# Patient Record
Sex: MALE | Race: WHITE | Employment: UNEMPLOYED | URBAN - METROPOLITAN AREA
[De-identification: names, ages, dates, MRNs, and addresses within clinical notes are randomized per-mention and may not be internally consistent; named-entity substitution may affect disease eponyms.]

---

## 2020-03-18 ENCOUNTER — OFFICE VISIT (OUTPATIENT)
Dept: ORTHOPEDIC SURGERY | Age: 20
End: 2020-03-18
Payer: COMMERCIAL

## 2020-03-18 VITALS — WEIGHT: 240 LBS | BODY MASS INDEX: 28.34 KG/M2 | HEIGHT: 77 IN

## 2020-03-18 PROCEDURE — L1832 KO ADJ JNT POS R SUP PRE CST: HCPCS | Performed by: ORTHOPAEDIC SURGERY

## 2020-03-18 PROCEDURE — 99205 OFFICE O/P NEW HI 60 MIN: CPT | Performed by: ORTHOPAEDIC SURGERY

## 2020-03-18 NOTE — PROGRESS NOTES
MD Selma Sanford, [place]         Orthopaedic Surgery and Sports Medicine      Patient Name: Alberto Snow  YOB: 2000  Patient's PCP is No primary care provider on file. SUBJECTIVE  Chief Complaint:  Knee Pain (RIGHT )      History of Present Illness:  Alberto Snow is a 22 y.o. male here regarding right knee pain. This is a 57-year-old gentleman who was in [place] this past December and was running and jumped over a chain landed on his right knee and sustained an immediate tibiofemoral dislocation. Was seen emergently and found to have both avascular and neurologic injury to his knee. The knee was reduced and he was immediately taken to surgery for the vascular injury. The date of his injury was 12/27/2020 and his surgery was performed early in the morning on 12/28/2020. Again the initial procedure was a vascular reconstruction posterior to the right knee. At that time he also had placement of an external fixator for stabilization of the knee. In addition to the external fixator he had of lower leg fasciotomy with both medial and lateral fasciotomies performed. He remained in the external fixator and he remained in [place] until earlier this week. His fixator was removed recently. His neurologic injury consisted certainly of an injury to his common peroneal nerve as well as some injury most likely to his tibial nerve and that he has decreased sensation he states on his heel. The patient is currently ambulating the assistance of a walker nonweightbearing on the extremity. Kadeem Do He rates pain at 7/10. Symptoms improve with rest.   The symptoms are made worse with being in an upright position. .  Sleep pattern is affected by the chief complaint: Yes, but improving. The patient has not had PT.    He is actually taking aspirin now on a daily basis actually takes 1 leg where he had a fasciotomy performed. Those were covered with the skin graft which are now well-healed. The harvest sites for a skin graft are healed also. He has good colors of the toes capillary refill is normal.  Because of his edema I did have some difficulty palpating pulses. Again he has good color sensation and good capillary refill. Gait: He is nonweightbearing with his walker. Palpation: no tenderness to palpation on the right lower extremity  He has a very tight Achilles. His foot is currently in equinus. Range of Motion: I did not attempt range of motion of his right knee today. Special Tests: He had no significant medial or lateral instability. But that stressing was limited. Strength: Although he has very poor tone he does appear to have some contraction capability of his quad and his hamstring. Neurologic & vascular: He has decreased sensation in the area of the right heel although he feels that he does have some sensation. He thinks that that has improved over the last couple months. He also has some sensation on the dorsal aspect of his foot he says it is not point normal although again significantly better over the last 2 months. Similar sensation in the first webspace. He cannot actively extend his ankle or his toes. He does have normal flexion of his ankle and toes. Additional Examinations:  Left Lower Extremity: Examination of the left lower extremity does not show any tenderness, deformity or injury. Range of motion is unremarkable. There is no gross instability. There are no rashes, ulcerations or lesions. Strength and tone are normal.      DIAGNOSTICS  Xrays obtained in office today: Yes  Xrays reviewed today: Yes  2 views of the right knee   Fracture: There is evidence of an avulsion fracture of the fibular collateral ligament from the lateral femoral condyle of the femur. Those fracture fragments are minimally displaced.   There is no other fracture that I can identify on these 2 views. On his lateral view he has definite posterior subluxation of the tibia it is a few millimeters certainly not severe. ASSESSMENT (Medical Decision Making)    Bruno Ocasio is a 22 y.o. male with the following diagnosis: Status post a right knee patellar dislocation which occurred 12/27/2020 in [place]. He has undergone revascularization on an emergent basis on 12/28/2020. He had a fasciotomy performed of the right lower extremity and he had placement of an external fixator which was just removed recently. He has been allowed to return to [place]. He returned to 48 hours ago and is here to see us for continued care. ICD-10-CM    1. Pain R52 XR KNEE RIGHT (1-2 VIEWS)     MRI Knee Right WO Contrast     OSR PT - [facility]     T Scope Breg Brace     EMG     CANCELED: EMG       His overall course is responding adequately to ongoing treatment      PLAN (Medical Decision Making)  Office Procedures:  Orders Placed This Encounter   Procedures    XR KNEE RIGHT (1-2 VIEWS)     Standing Status:   Future     Number of Occurrences:   1     Standing Expiration Date:   3/18/2021    MRI Knee Right WO Contrast     Standing Status:   Future     Standing Expiration Date:   3/18/2021     Scheduling Instructions:      [facility] @ [facility] [place]     Order Specific Question:   Reason for exam:     Answer:   pcl tear, acl tear, lcl tear    [facility] PT - [facility]     Referral Priority:   Routine     Referral Type:   Eval and Treat     Requested Specialty:   Physical Therapy     Number of Visits Requested:   1    EMG     PERONEAL NERVE INJURY     Standing Status:   Future     Standing Expiration Date:   3/18/2021     Order Specific Question:   Which body part? Answer:   RIGHT LOWER EXTREMITY    T Scope Breg Brace     Patient was prescribed a Breg T-Scope Brace.   The right knee will require stabilization / immobilization from this semi-rigid / rigid orthosis to improve their function. The orthosis will assist in protecting the affected area, provide functional support and facilitate healing. The prefabricated orthosis was modified in the following manner to provide a customizable fit for the patient at the time of delivery. 1.  Identification of appropriate positioning and alignment of anatomical landmarks. 2.  Trimming of straps and adjustment of frame to specifically fit patient. 3.  Polycentric hinge adjustment in flexion and extension. The patient was educated and fit by a healthcare professional with expert knowledge and specialization in brace application while under the direct supervision of the treating physician. Verbal and written instructions for the use of and application of this item were provided. They were instructed to contact the office immediately should the brace result in increased pain, decreased sensation, increased swelling or worsening of the condition. Treatment Plan:    I discussed the diagnosis and treatment options with Becky Godoy today. I spent 60 minutes face to face with this patient today. Greater than 50% of that time was spent counseling or coordinating care. We discussed obtaining an EMG study to evaluate the status of the neuro neurologic supply of the right lower extremity. We would test both the tibial and common peroneal nerve. He also will need a vascular Doppler study of the lower extremity which would be performed in about 2 weeks. He has been sent for an MRI scan to evaluate the current status of his knee from a internal derangement standpoint. He certainly has had a posterior lateral corner injury as well as a posterior cruciate ligament injury. We are not aware of the other status of the status of the other structures within the knee such as the ACL and the medial or lateral meniscus.   We do not have paperwork today from [place] although apparently the patient's family has it with

## 2020-03-19 ENCOUNTER — TELEPHONE (OUTPATIENT)
Dept: ORTHOPEDIC SURGERY | Age: 20
End: 2020-03-19

## 2020-03-19 NOTE — TELEPHONE ENCOUNTER
[place] [facility] [place]  NO PRECERT REQUIRED - PER  MARYBETH @ Pending sale to [facility]   REF #  [id number]  MP

## 2020-03-20 ENCOUNTER — HOSPITAL ENCOUNTER (OUTPATIENT)
Dept: VASCULAR LAB | Age: 20
Discharge: HOME | End: 2020-03-20
Payer: COMMERCIAL

## 2020-03-20 ENCOUNTER — HOSPITAL ENCOUNTER (OUTPATIENT)
Dept: PHYSICAL THERAPY | Age: 20
Setting detail: THERAPIES SERIES
Discharge: HOME | End: 2020-03-20
Payer: COMMERCIAL

## 2020-03-20 PROCEDURE — 97140 MANUAL THERAPY 1/> REGIONS: CPT | Performed by: PHYSICAL THERAPIST

## 2020-03-20 PROCEDURE — 97161 PT EVAL LOW COMPLEX 20 MIN: CPT | Performed by: PHYSICAL THERAPIST

## 2020-03-20 PROCEDURE — 97110 THERAPEUTIC EXERCISES: CPT | Performed by: PHYSICAL THERAPIST

## 2020-03-20 PROCEDURE — 93971 EXTREMITY STUDY: CPT

## 2020-03-20 NOTE — PLAN OF CARE
The [address] [place] [address] and Sports Rehabilitation, [address] [facility], [facility], [place] [facility] [place]  Phone: [phone]   Fax:     [phone]                                                       Physical Therapy Certification    Dear Referring Practitioner: dr Ale South ,    We had the pleasure of evaluating the following patient for physical therapy services at [address] [place] [address]. A summary of our findings can be found in the initial assessment below. This includes our plan of care. If you have any questions or concerns regarding these findings, please do not hesitate to contact me at the office phone number checked above. Thank you for the referral.       Physician Signature:_______________________________Date:__________________  By signing above (or electronic signature), therapists plan is approved by physician      Patient: Manny Alvarez   : 2000   MRN: [id number]  Referring Physician: Referring Practitioner: dr Ale South       Evaluation Date: 3/20/2020      Medical Diagnosis Information:  Diagnosis: R52 (ICD-10-CM) - Pain   Treatment Diagnosis: right knee pain - m25.561                                         Insurance information: PT Insurance Information: anthem     Precautions/ Contra-indications:   Latex Allergy:  [x]NO      []YES  Preferred Language for Healthcare:   [x]English       []other:    SUBJECTIVE: Patient stated complaint:  Rey Paula presents with a massive knee injury from this past dec. Had vascular salvage procedure as well as a ext fixation procedure. Will be in need of a multiple ligament reconstruction procedure as well as an afo for his transient foot drop. Presents with crutches and nwb.       Relevant Medical History:       Functional Disability Index: 85% lefs     Pain Scale: 7/10    Easing factors:   Rest    Provocative factors: wbing/motion     Night Pain:  -- complained of night pain associated with sleep that has a direct and significant impact on the need for therapy. (Significantly impacts the rate of recovery)  []  The patient has a complexity that has a direct and significant impact on the need for therapy. (Significantly impacts the rate of recovery and is associated with a primary condition.)       []  The patient has associated variables that influence the amount of treatment to include:  Social support, self-efficacy/motivation, prognosis, time since onset/acuity. []  The patient has generalized musculoskeletal conditions or a condition affecting multiple sites that will have a direct impact on the rate of recovery. []  The patient had a prior episode of outpatient therapy during this calendar year for a different condition. []  The patient has a mental or cognitive disorder in addition to the condition being treated that will have a direct and significant impact on the rate of recovery. Falls Risk Assessment (30 days):   [] Falls Risk assessed and no intervention required.   [] Falls Risk assessed and Patient requires intervention due to being higher risk   TUG score (>12s at risk):     [] Falls education provided, including              ASSESSMENT:   Functional Impairments   [x]Noted spinal or UE joint hypomobility   [x]Noted spinal or UE joint hypermobility   [x]Decreased UE functional ROM   [x]Decreased UE functional strength   [x]Abnormal reflexes/sensation/myotomal/dermatomal deficits   [x]Decreased RC/scapular/core strength and neuromuscular control   []other:      Functional Activity Limitations (from functional questionnaire and intake)   [x]Reduced ability to tolerate prolonged functional positions   [x]Reduced ability or difficulty with changes of positions or transfers between positions   [x]Reduced ability to maintain good posture and demonstrate good body mechanics with sitting, bending, and lifting   [x] Reduced ability or tolerance with driving and/or computer work   [x]Reduced ability to sleep   [x]Reduced ability to perform lifting, reaching, carrying tasks   [x]Reduced ability to tolerate impact through UE   [x]Reduced ability to reach behind back   [x]Reduced ability to  or hold objects   [x]Reduced ability to throw or toss an object    Participation Restrictions   [x]Reduced participation in self care activities   [x]Reduced participation in home management activities   [x]Reduced participation in work activities   [x]Reduced participation in social activities. [x]Reduced participation in sport activities. Classification :  - ligament or other connective tissue dysfunction. (Pattern 4D)  - localized inflammation. (Pattern 4E)    Prognosis/Rehab Potential:     -  - Good     Factors affecting rehab potential:  -- associated co-morbidities    Tolerance of evaluation/treatment:   -  - Good   Physical Therapy Evaluation Complexity Justification  [] A history of present problem with:  [] no personal factors and/or comorbidities that impact the plan of care;  []1-2 personal factors and/or comorbidities that impact the plan of care  []3 personal factors and/or comorbidities that impact the plan of care  [] An examination of body systems using standardized tests and measures addressing any of the following: body structures and functions (impairments), activity limitations, and/or participation restrictions;:  [] a total of 1-2 or more elements   [] a total of 3 or more elements   [] a total of 4 or more elements   [x] A clinical presentation with:  [] stable and/or uncomplicated characteristics   [x] evolving clinical presentation with changing characteristics  [] unstable and unpredictable characteristics;   [x] Clinical decision making of [x] low, [] moderate, [] high complexity using standardized patient assessment instrument and/or measurable assessment of functional outcome.     [x] EVAL (LOW) 29212 (typically 30 minutes face-to-face)  [] EVAL (MOD) 95510 (typically 30 minutes face-to-face)  [] EVAL (HIGH) 60626 (typically 45 minutes face-to-face)  [] RE-EVAL         Co-morbidities/Complexities (which will affect course of rehabilitation):   []None           Arthritic conditions   []Rheumatoid arthritis (M05.9)  []Osteoarthritis (M19.91)   Cardiovascular conditions   []Hypertension (I10)  []Hyperlipidemia (E78.5)  []Angina pectoris (I20)  []Atherosclerosis (I70)   Musculoskeletal conditions   []Disc pathology   []Congenital spine pathologies   []Prior surgical intervention  []Osteoporosis (M81.8)  []Osteopenia (M85.8)   Endocrine conditions   []Hypothyroid (E03.9)  []Hyperthyroid Gastrointestinal conditions   []Constipation (P12.31)   Metabolic conditions   []Morbid obesity (E66.01)  []Diabetes type 1(E10.65) or 2 (E11.65)   []Neuropathy (G60.9)     Pulmonary conditions   []Asthma (J45)  []Coughing   []COPD (J44.9)   Psychological Disorders  []Anxiety (F41.9)  []Depression (F32.9)   []Other:   []Other:            PLAN:  Frequency/Duration: 2 days per week for  8    Weeks:  INTERVENTIONS:  1. Therapeutic exercise including: strength training, ROM, NMR and proprioception for the scapula, core and Upper extremity  2. Manual therapy as indicated including Dry Needling/IASTM, STM, PROM, Gr I-IV mobilizations, spinal mobilization/manipulation. 3. Modalities as needed including: thermal agents, E-stim, US, iontophoresis as indicated. 4. Patient education on joint protection, activity modification, progression of HEP. HEP instruction:  See media file     GOALS:  Patient stated goal:     Therapist goals for Patient:   Short Term Goals: To be achieved in: 2 weeks  - Independent in HEP and progression per patient tolerance, in order to prevent re-injury. -  Patient will have a decrease in pain to facilitate improvement in movement, function, and ADLs as indicated by Functional Deficits. Long Term Goals:  To be achieved in:12  weeks  - The patient is expected to demonstrate less than  50%

## 2020-03-21 NOTE — FLOWSHEET NOTE
in LE, proximal hip, and core control with self care, mobility, lifting, ambulation. [x] (01928) Provided verbal/tactile cueing for activities related to improving balance, coordination, kinesthetic sense, posture, motor skill, proprioception to assist with LE, proximal hip, and core control in self-care, mobility, lifting, ambulation and eccentric single leg control. NMR and Therapeutic Activities:    [x] (24627 or 60564) Provided verbal/tactile cueing for activities related to improving balance, coordination, kinesthetic sense, posture, motor skill, proprioception and motor activation to allow for proper function of core, proximal hip and LE with self-care and ADLs and functional mobility.   [] (14848) Gait Re-education- Provided training and instruction to the patient for proper LE, core and proximal hip recruitment and positioning and eccentric body weight control with ambulation re-education including up and down stairs     Home Exercise Program:    [x] (95130) Reviewed/Progressed HEP activities related to strengthening, flexibility, endurance, ROM of core, proximal hip and LE for functional self-care, mobility, lifting and ambulation/stair navigation   [] (79285) Reviewed/Progressed HEP activities related to improving balance, coordination, kinesthetic sense, posture, motor skill, proprioception of core, proximal hip and LE for self-care, mobility, lifting, and ambulation/stair navigation      Manual Treatments:  PROM / STM / Oscillations-Mobs:  G-I, II, III, IV (PA's, Inf., Post.)  [] (98236) Provided manual therapy to mobilize LE, proximal hip and/or LS spine soft tissue/joints for the purpose of modulating pain, promoting relaxation, increasing ROM, reducing/eliminating soft tissue swelling/inflammation/restriction, improving soft tissue extensibility and allowing for proper ROM for normal function with self-care, mobility, lifting and ambulation.      Modalities:     [] GAME READY (VASO)- for significant edema, swelling, pain control. Charges:  Timed Code Treatment Minutes: 46'    Total Treatment Minutes: 68'       [x] EVAL (LOW) 08914 (typically 20 minutes face-to-face)  [] EVAL (MOD) 62053 (typically 30 minutes face-to-face)  [] EVAL (HIGH) 07383 (typically 45 minutes face-to-face)  [] RE-EVAL     [x] TYLOR(61396) x   1  [] IONTO  [] NMR (24155) x     [] VASO  [x] Manual (20082) x 2     [] Other:  [] TA x      [] Mech Traction (14496)  [] ES(attended) (68573)      [] ES (un) (90509):         ASSESSMENT:        GOALS:   Patient stated goal:    [] Progressing: [] Met: [] Not Met: [] Adjusted    Therapist goals for Patient:   Short Term Goals: To be achieved in: 2 weeks  1. Independent in HEP and progression per patient tolerance, in order to prevent re-injury. [] Progressing: [] Met: [] Not Met: [] Adjusted   2. Patient will have a decrease in pain to facilitate improvement in movement, function, and ADLs as indicated by Functional Deficits. [] Progressing: [] Met: [] Not Met: [] Adjusted    Long Term Goals: To be achieved in:   12  weeks  - The patient is expected to demonstrate less than  50% impairment, limitation or restriction in:  - walking and moving around (mobility)  - Patient will demonstrate increased passive and active ROM right knee to 130 degrees  to allow for proper joint functioning as indicated by patients Functional Deficits. [] Progressing: [] Met: [] Not Met: [] Adjusted  - Patient will demonstrate an increase in Strength to 4/5 knee to allow for proper functional mobility as indicated by patients Functional Deficits. [] Progressing: [] Met: [] Not Met: [] Adjusted  - Patient will return to desired, higher level upper extremity functional activities without increased symptoms or restriction.       [] Progressing: [] Met: [] Not Met: [] Adjusted          Overall Progression Towards Functional goals/ Treatment Progress Update:  [] Patient is progressing as expected towards

## 2020-03-23 ENCOUNTER — TELEPHONE (OUTPATIENT)
Dept: ORTHOPEDIC SURGERY | Age: 20
End: 2020-03-23

## 2020-03-23 NOTE — TELEPHONE ENCOUNTER
3/23/2020  This telephone visit is a follow up to an in person exam of [facility]. Telephone conversation with \"Conor\". Today I called Tracey Tiwari and gave him the results of his venous doppler study: normal, and his MRI scan. The MRI identified several injuries to his right knee. The scan is consistent with findings of a knee dislocation. Torn ACL, PCL, FCL, BALJIT, and posterior capsule (consistent with a PLC injury). Also a tear of the medial meniscus. I explained that this will require an extensive procedure but one that we should be able to do in one setting. Because of the length of the procedure we probably will do without a tourniquet which would be safer for his arterial injury. He understands that we will need to get some improvement in his ROM prior to having surgery. I do believe that when we can get his ROM to an appropriate point we should proceed as an urgent surgery. Tracey Tiwari has an arterial study tomorrow which is very important to evaluate the repair/reconstruction of his arterial injury.     Marci Can MD

## 2020-03-24 ENCOUNTER — HOSPITAL ENCOUNTER (OUTPATIENT)
Dept: PHYSICAL THERAPY | Age: 20
Setting detail: THERAPIES SERIES
Discharge: HOME | End: 2020-03-24
Payer: COMMERCIAL

## 2020-03-24 ENCOUNTER — HOSPITAL ENCOUNTER (OUTPATIENT)
Dept: PHYSICAL THERAPY | Age: 20
Setting detail: THERAPIES SERIES
End: 2020-03-24
Payer: COMMERCIAL

## 2020-03-24 ENCOUNTER — HOSPITAL ENCOUNTER (OUTPATIENT)
Dept: VASCULAR LAB | Age: 20
Discharge: HOME | End: 2020-03-24
Payer: COMMERCIAL

## 2020-03-24 PROCEDURE — 93926 LOWER EXTREMITY STUDY: CPT

## 2020-03-24 PROCEDURE — G0283 ELEC STIM OTHER THAN WOUND: HCPCS | Performed by: PHYSICAL THERAPIST

## 2020-03-24 PROCEDURE — 97110 THERAPEUTIC EXERCISES: CPT | Performed by: PHYSICAL THERAPIST

## 2020-03-24 PROCEDURE — 97140 MANUAL THERAPY 1/> REGIONS: CPT | Performed by: PHYSICAL THERAPIST

## 2020-03-24 NOTE — FLOWSHEET NOTE
The [facility] and Sports Rehabilitation, [address] [facility], [address]  Phone: [phone]   Fax:     [phone]        Physical Therapy Treatment Note/ Progress Report:           Date:  3/20/2020  Patient Name:  Maryanne Victor    :  2000  MRN: [id number]  Restrictions/Precautions:    Medical/Treatment Diagnosis Information:  · Diagnosis: R52 (ICD-10-CM) - Pain  · Treatment Diagnosis: right knee pain - B15.665  Insurance/Certification information:  PT Insurance Information: navjot   Physician Information:  Referring Practitioner: dr Gisel Rider   Has the plan of care been signed (Y/N):        []? Yes                    [x]? No         Date of Patient follow up with Physician:       Is this a Progress Report:     []  Yes  [x]  No        If Yes:  Date Range for reporting period:  Beginning   Ending     Progress report will be due (10 Rx or 30 days whichever is less):       Recertification will be due (POC Duration  / 90 days whichever is less):         Visit # Insurance Allowable Auth Required   3  []  Yes []  No        Functional Scale:     Date assessed:        Latex Allergy:  [x]NO      []YES  Preferred Language for Healthcare:   [x]English       []other:      Pain level:  2-7/10     SUBJECTIVE:  Pt had vascular study done this morning. Does not know full results but nothing concerning. Also reviewed MRI with Dr. Franci Hayward over the phone - LCL/PCL/ACL torn - planning to reconstruct all at the same time. Hopefully having surgery in 3-4 weeks. Has continued to work on ankle and knee ROM regularly at home.      OBJECTIVE: See eval   Observation:    Test measurements: Knee Flexion PROM 75 deg     RESTRICTIONS/PRECAUTIONS:     Exercises/Interventions:       Therapeutic Ex (92630) Sets/sec Reps Notes/CUES   HS stretch - towel  prop 5 x 30\"     Calf stretch - towel prop/strap 5 x 30\"     Quad sets   20 x 10\"  W/ biphasic NMES   Adductor ball Meets All Above Stages   []  Not Ready for Return to Sports   Comments:                               PLAN: See eval  [] Continue per plan of care [] Alter current plan (see comments above)  [x] Plan of care initiated [] Hold pending MD visit [] Discharge      Electronically signed by:  Ree Harley, PT, DPT   Note: If patient does not return for scheduled/ recommended follow up visits, this note will serve as a discharge from care along with most recent update on progress.

## 2020-03-26 ENCOUNTER — HOSPITAL ENCOUNTER (OUTPATIENT)
Dept: PHYSICAL THERAPY | Age: 20
Setting detail: THERAPIES SERIES
Discharge: HOME | End: 2020-03-26
Payer: COMMERCIAL

## 2020-03-26 PROCEDURE — G0283 ELEC STIM OTHER THAN WOUND: HCPCS | Performed by: PHYSICAL THERAPIST

## 2020-03-26 PROCEDURE — 97140 MANUAL THERAPY 1/> REGIONS: CPT | Performed by: PHYSICAL THERAPIST

## 2020-03-26 PROCEDURE — 97110 THERAPEUTIC EXERCISES: CPT | Performed by: PHYSICAL THERAPIST

## 2020-03-26 NOTE — FLOWSHEET NOTE
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 A TENS and muscle stim device is being prescribed to reduce pain and facilitate strengthening of their quad contraction. This is in accordance with the therapist's established rehabilitation plan to treat knee pain and quad atrophy. Niru Vasquez, PT    Therapeutic Exercise and NMR EXR  [x] (34893) Provided verbal/tactile cueing for activities related to strengthening, flexibility, endurance, ROM for improvements in LE, proximal hip, and core control with self care, mobility, lifting, ambulation. [x] (88364) Provided verbal/tactile cueing for activities related to improving balance, coordination, kinesthetic sense, posture, motor skill, proprioception to assist with LE, proximal hip, and core control in self-care, mobility, lifting, ambulation and eccentric single leg control.      NMR and Therapeutic Activities:    [x] (68154 or 17837) Provided verbal/tactile cueing for activities related to improving balance, coordination, kinesthetic sense, posture, motor skill, proprioception and motor activation to allow for proper function of core, proximal hip and LE with self-care and ADLs and functional mobility.   [] (87802) Gait Re-education- Provided training and instruction to the patient for proper LE, core and proximal hip recruitment and positioning and eccentric body weight control with ambulation re-education including up and down stairs     Home Exercise Program:    [x] (69975) Reviewed/Progressed HEP activities related to strengthening, flexibility, endurance, ROM of core, proximal hip and LE for functional self-care, mobility, lifting and ambulation/stair navigation   [] (38602) Reviewed/Progressed HEP activities related to improving balance, coordination, kinesthetic sense, posture, motor skill, proprioception of core, proximal hip and LE for self-care, mobility, lifting, and ambulation/stair navigation      Manual Treatments:  PROM / STM / Oscillations-Mobs:  G-I, II, III, IV (PA's, Inf., Post.)  [x] (99233) Provided manual therapy to mobilize LE, proximal hip and/or LS spine soft tissue/joints for the purpose of modulating pain, promoting relaxation, increasing ROM, reducing/eliminating soft tissue swelling/inflammation/restriction, improving soft tissue extensibility and allowing for proper ROM for normal function with self-care, mobility, lifting and ambulation. Modalities:  Ice 15' (end)   [] GAME READY (VASO)- for significant edema, swelling, pain control. [place] NMES - 15' (10\"on/20\"off)    Charges:  Timed Code Treatment Minutes: 50'    Total Treatment Minutes: 72'    3:30 - 4:55     [] EVAL (LOW) 40272 (typically 20 minutes face-to-face)  [] EVAL (MOD) 16652 (typically 30 minutes face-to-face)  [] EVAL (HIGH) 94523 (typically 45 minutes face-to-face)  [] RE-EVAL     [x] RY(66061) x   2  [] IONTO  [] NMR (25490) x     [] VASO  [x] Manual (45485) x 1     [] Other:  [] TA x      [] Mech Traction (04092)  [] ES(attended) (61071)      [x] ES (un) (28097):         ASSESSMENT:        GOALS:   Patient stated goal:    [] Progressing: [] Met: [] Not Met: [] Adjusted    Therapist goals for Patient:   Short Term Goals: To be achieved in: 2 weeks  1. Independent in HEP and progression per patient tolerance, in order to prevent re-injury. [] Progressing: [] Met: [] Not Met: [] Adjusted   2.  Patient will have a Updated HEP provided today. Will continue to progress and monitor ROM closely. Return to Play: (if applicable)   []  Stage 1: Intro to Strength   []  Stage 2: Return to Run and Strength   []  Stage 3: Return to Jump and Strength   []  Stage 4: Dynamic Strength and Agility   []  Stage 5: Sport Specific Training     []  Ready to Return to Play, Meets All Above Stages   []  Not Ready for Return to Sports   Comments:                               PLAN: See eval  [] Continue per plan of care [] Alter current plan (see comments above)  [x] Plan of care initiated [] Hold pending MD visit [] Discharge      Electronically signed by:  Piter Harris, PT, DPT   Note: If patient does not return for scheduled/ recommended follow up visits, this note will serve as a discharge from care along with most recent update on progress.

## 2020-03-30 ENCOUNTER — OFFICE VISIT (OUTPATIENT)
Dept: ORTHOPEDIC SURGERY | Age: 20
End: 2020-03-30
Payer: COMMERCIAL

## 2020-03-30 PROCEDURE — 95886 MUSC TEST DONE W/N TEST COMP: CPT | Performed by: PHYSICAL MEDICINE & REHABILITATION

## 2020-03-30 PROCEDURE — 95908 NRV CNDJ TST 3-4 STUDIES: CPT | Performed by: PHYSICAL MEDICINE & REHABILITATION

## 2020-03-31 ENCOUNTER — HOSPITAL ENCOUNTER (OUTPATIENT)
Dept: PHYSICAL THERAPY | Age: 20
Setting detail: THERAPIES SERIES
Discharge: HOME | End: 2020-03-31
Payer: COMMERCIAL

## 2020-03-31 PROCEDURE — 97110 THERAPEUTIC EXERCISES: CPT | Performed by: PHYSICAL THERAPIST

## 2020-03-31 PROCEDURE — 97140 MANUAL THERAPY 1/> REGIONS: CPT | Performed by: PHYSICAL THERAPIST

## 2020-03-31 PROCEDURE — G0283 ELEC STIM OTHER THAN WOUND: HCPCS | Performed by: PHYSICAL THERAPIST

## 2020-03-31 NOTE — FLOWSHEET NOTE
[place] NMES   Adductor ball squeeze SLR supine Alt - SAQ & HS isometric eob knee flexion (ll/ld)  Knee ext ll/ld  10' Prop w/ Ice   Ankle DF ll/ld  Wall slide - supine ERMI - ankle/knee flexionator 1' 7x                Education   2'                       Manual Intervention (49569)      Prom/stm - ankle; IASTM - gastrocsoleus; posterior glide + DF  25'                                                                                                                                             Therapeutic Exercise and NMR EXR  [x] (74018) Provided verbal/tactile cueing for activities related to strengthening, flexibility, endurance, ROM for improvements in LE, proximal hip, and core control with self care, mobility, lifting, ambulation. [x] (80654) Provided verbal/tactile cueing for activities related to improving balance, coordination, kinesthetic sense, posture, motor skill, proprioception to assist with LE, proximal hip, and core control in self-care, mobility, lifting, ambulation and eccentric single leg control.      NMR and Therapeutic Activities:    [x] (40407 or 88981) Provided verbal/tactile cueing for activities related to improving balance, coordination, kinesthetic sense, posture, motor skill, proprioception and motor activation to allow for proper function of core, proximal hip and LE with self-care and ADLs and functional mobility.   [] (44205) Gait Re-education- Provided training and instruction to the patient for proper LE, core and proximal hip recruitment and positioning and eccentric body weight control with ambulation re-education including up and down stairs     Home Exercise Program:    [x] (56580) Reviewed/Progressed HEP activities related to strengthening, flexibility, endurance, ROM of core, proximal hip and LE for functional self-care, mobility, lifting and ambulation/stair navigation   [] (60840) Reviewed/Progressed HEP activities related to improving balance, coordination, kinesthetic demonstrate increased passive and active ROM right knee to 130 degrees  to allow for proper joint functioning as indicated by patients Functional Deficits. [] Progressing: [] Met: [] Not Met: [] Adjusted  - Patient will demonstrate an increase in Strength to 4/5 knee to allow for proper functional mobility as indicated by patients Functional Deficits. [] Progressing: [] Met: [] Not Met: [] Adjusted  - Patient will return to desired, higher level upper extremity functional activities without increased symptoms or restriction. [] Progressing: [] Met: [] Not Met: [] Adjusted          Overall Progression Towards Functional goals/ Treatment Progress Update:  [] Patient is progressing as expected towards functional goals listed. [] Progression is slowed due to complexities/Impairments listed. [] Progression has been slowed due to co-morbidities. [x] Plan just implemented, too soon to assess goals progression <30days   [] Goals require adjustment due to lack of progress  [] Patient is not progressing as expected and requires additional follow up with physician  [] Other    Prognosis for POC: [x] Good [] Fair  [] Poor      Patient requires continued skilled intervention: [x] Yes  [] No    Treatment/Activity Tolerance:  [x] Patient able to complete treatment  [] Patient limited by fatigue  [] Patient limited by pain    [] Patient limited by other medical complications  [x] Other: Pt progressing steadily. Reduced swelling and stiffness of ankle with improved DF/Inv/Ev and talar tilt. Progressed knee flexion ROM by 5 deg since last visit. Extra time spent on ERMI today as pt was getting good stretch at knee and ankle. Good tolerance with IASTM with ecchymosis more distally.      Return to Play: (if applicable)   []  Stage 1: Intro to Strength   []  Stage 2: Return to Run and Strength   []  Stage 3: Return to Jump and Strength   []  Stage 4: Dynamic Strength and Agility   []  Stage 5: Sport Specific Training     [] Ready to Return to Play, Meets All Above Stages   []  Not Ready for Return to Sports   Comments:                               PLAN: See eval  [] Continue per plan of care [] Alter current plan (see comments above)  [x] Plan of care initiated [] Hold pending MD visit [] Discharge      Electronically signed by:  Marco A Perez PT, DPT   Note: If patient does not return for scheduled/ recommended follow up visits, this note will serve as a discharge from care along with most recent update on progress.

## 2020-04-02 ENCOUNTER — HOSPITAL ENCOUNTER (OUTPATIENT)
Dept: PHYSICAL THERAPY | Age: 20
Setting detail: THERAPIES SERIES
Discharge: HOME | End: 2020-04-02
Payer: COMMERCIAL

## 2020-04-02 PROCEDURE — 97140 MANUAL THERAPY 1/> REGIONS: CPT | Performed by: PHYSICAL THERAPIST

## 2020-04-02 PROCEDURE — G0283 ELEC STIM OTHER THAN WOUND: HCPCS | Performed by: PHYSICAL THERAPIST

## 2020-04-02 PROCEDURE — 97110 THERAPEUTIC EXERCISES: CPT | Performed by: PHYSICAL THERAPIST

## 2020-04-02 NOTE — FLOWSHEET NOTE
10x Prop under ankle; QS each rep   Alt - SAQ & HS isometric 3\" 15x    eob knee flexion (ll/ld)  Knee ext ll/ld  10' Prop w/ Ice   Ankle DF ll/ld  Wall slide - supine ERMI - ankle/knee flexionator 1' 7x                Education                         Manual Intervention (56243)      Prom/stm - ankle; IASTM - gastrocsoleus; posterior glide + DF  25'                                                                                                                                             Therapeutic Exercise and NMR EXR  [x] (46921) Provided verbal/tactile cueing for activities related to strengthening, flexibility, endurance, ROM for improvements in LE, proximal hip, and core control with self care, mobility, lifting, ambulation. [x] (79977) Provided verbal/tactile cueing for activities related to improving balance, coordination, kinesthetic sense, posture, motor skill, proprioception to assist with LE, proximal hip, and core control in self-care, mobility, lifting, ambulation and eccentric single leg control.      NMR and Therapeutic Activities:    [x] (01283 or 57453) Provided verbal/tactile cueing for activities related to improving balance, coordination, kinesthetic sense, posture, motor skill, proprioception and motor activation to allow for proper function of core, proximal hip and LE with self-care and ADLs and functional mobility.   [] (27129) Gait Re-education- Provided training and instruction to the patient for proper LE, core and proximal hip recruitment and positioning and eccentric body weight control with ambulation re-education including up and down stairs     Home Exercise Program:    [x] (49790) Reviewed/Progressed HEP activities related to strengthening, flexibility, endurance, ROM of core, proximal hip and LE for functional self-care, mobility, lifting and ambulation/stair navigation   [] (90605) Reviewed/Progressed HEP activities related to improving balance, coordination, kinesthetic sense, todd. Will continue to track closely. Return to Play: (if applicable)   []  Stage 1: Intro to Strength   []  Stage 2: Return to Run and Strength   []  Stage 3: Return to Jump and Strength   []  Stage 4: Dynamic Strength and Agility   []  Stage 5: Sport Specific Training     []  Ready to Return to Play, Meets All Above Stages   []  Not Ready for Return to Sports   Comments:                               PLAN: See eval  [] Continue per plan of care [] Alter current plan (see comments above)  [x] Plan of care initiated [] Hold pending MD visit [] Discharge      Electronically signed by:  Radha Ye, PT, DPT   Note: If patient does not return for scheduled/ recommended follow up visits, this note will serve as a discharge from care along with most recent update on progress.

## 2020-04-07 ENCOUNTER — HOSPITAL ENCOUNTER (OUTPATIENT)
Dept: PHYSICAL THERAPY | Age: 20
Setting detail: THERAPIES SERIES
Discharge: HOME | End: 2020-04-07
Payer: COMMERCIAL

## 2020-04-07 PROCEDURE — G0283 ELEC STIM OTHER THAN WOUND: HCPCS | Performed by: PHYSICAL THERAPIST

## 2020-04-07 PROCEDURE — 97110 THERAPEUTIC EXERCISES: CPT | Performed by: PHYSICAL THERAPIST

## 2020-04-07 PROCEDURE — 97140 MANUAL THERAPY 1/> REGIONS: CPT | Performed by: PHYSICAL THERAPIST

## 2020-04-07 PROCEDURE — 97112 NEUROMUSCULAR REEDUCATION: CPT | Performed by: PHYSICAL THERAPIST

## 2020-04-07 NOTE — FLOWSHEET NOTE
activities related to improving balance, coordination, kinesthetic sense, posture, motor skill, proprioception of core, proximal hip and LE for self-care, mobility, lifting, and ambulation/stair navigation      Manual Treatments:  PROM / STM / Oscillations-Mobs:  G-I, II, III, IV (PA's, Inf., Post.)  [x] (32686) Provided manual therapy to mobilize LE, proximal hip and/or LS spine soft tissue/joints for the purpose of modulating pain, promoting relaxation, increasing ROM, reducing/eliminating soft tissue swelling/inflammation/restriction, improving soft tissue extensibility and allowing for proper ROM for normal function with self-care, mobility, lifting and ambulation. Modalities:  Ice 15' (end)   [] GAME READY (VASO)- for significant edema, swelling, pain control. NMES - 15' (10\"on/20\"off)    Charges:  Timed Code Treatment Minutes: 48'    Total Treatment Minutes: 60'    9:28 - 10:38     [] EVAL (LOW) 68933 (typically 20 minutes face-to-face)  [] EVAL (MOD) 96348 (typically 30 minutes face-to-face)  [] EVAL (HIGH) 95135 (typically 45 minutes face-to-face)  [] RE-EVAL     [x] IJ(43804) x   1  [] IONTO  [x] NMR (06674) x 1    [] VASO  [x] Manual (64318) x 1     [] Other:  [] TA x      [] Mech Traction (44590)  [] ES(attended) (54668)      [x] ES (un) (59083):         ASSESSMENT:        GOALS:   Patient stated goal:    [] Progressing: [] Met: [] Not Met: [] Adjusted    Therapist goals for Patient:   Short Term Goals: To be achieved in: 2 weeks  1. Independent in HEP and progression per patient tolerance, in order to prevent re-injury. [] Progressing: [] Met: [] Not Met: [] Adjusted   2. Patient will have a decrease in pain to facilitate improvement in movement, function, and ADLs as indicated by Functional Deficits. [] Progressing: [] Met: [] Not Met: [] Adjusted    Long Term Goals:  To be achieved in:   12  weeks  - The patient is expected to demonstrate less than  50% impairment, limitation or restriction

## 2020-04-08 ENCOUNTER — TELEPHONE (OUTPATIENT)
Dept: ORTHOPEDIC SURGERY | Age: 20
End: 2020-04-08

## 2020-04-08 ENCOUNTER — OFFICE VISIT (OUTPATIENT)
Dept: ORTHOPEDIC SURGERY | Age: 20
End: 2020-04-08
Payer: COMMERCIAL

## 2020-04-08 VITALS — WEIGHT: 240.08 LBS | HEIGHT: 77 IN | BODY MASS INDEX: 28.35 KG/M2

## 2020-04-08 PROCEDURE — L4361 PNEUMA/VAC WALK BOOT PRE OTS: HCPCS | Performed by: ORTHOPAEDIC SURGERY

## 2020-04-08 PROCEDURE — MISC250 COMPRESSION STOCKING: Performed by: ORTHOPAEDIC SURGERY

## 2020-04-08 PROCEDURE — 99214 OFFICE O/P EST MOD 30 MIN: CPT | Performed by: ORTHOPAEDIC SURGERY

## 2020-04-08 PROCEDURE — E0114 CRUTCH UNDERARM PAIR NO WOOD: HCPCS | Performed by: ORTHOPAEDIC SURGERY

## 2020-04-08 NOTE — PROGRESS NOTES
lower extremity does not show any tenderness, deformity or injury. Range of motion is unremarkable. There is no gross instability. There are no rashes, ulcerations or lesions. Strength and tone are normal.      DIAGNOSTICS  Has had several studies since we last saw most importantly though his EMG study was performed by Dr. Ebonie Almendarez. 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 It was suggested that we repeat the study in 3 to 4 months. That reading is pretty consistent with his physical examination. ASSESSMENT (Medical Decision Making)    Kelsey Owen is a 22 y.o. male with the following diagnosis: Status post a right knee severe dislocation which occurred 12/27/2020 in [place]. He has undergone revascularization on an emergent basis on 12/28/2020. He had a fasciotomy performed of the right lower extremity and he had placement of an external fixator which was just removed recently. He has returned to [facility] and started outpatient physical therapy and has shown significant improvement although still limited range of motion of his knee and his ankle. His overall course is responding adequately to ongoing treatment      PLAN (Medical Decision Making)  Office Procedures:  No orders of the defined types were placed in this encounter. Treatment Plan:    I discussed the diagnosis and treatment options with Kelsey Owen today    I spent 60 minutes face to face with this patient today. Greater than 50% of that time was spent counseling or coordinating care. The current plan today was to discontinue the use of his walker and have him start ambulating with crutches. We will get a discontinue the T ROM brace since his knee is currently stable due to the scarring that has occurred.   He will continue outpatient physical therapy to try to progress range of motion of his ankle and his

## 2020-04-09 ENCOUNTER — HOSPITAL ENCOUNTER (OUTPATIENT)
Dept: PHYSICAL THERAPY | Age: 20
Setting detail: THERAPIES SERIES
Discharge: HOME | End: 2020-04-09
Payer: COMMERCIAL

## 2020-04-09 PROCEDURE — 97140 MANUAL THERAPY 1/> REGIONS: CPT | Performed by: PHYSICAL THERAPIST

## 2020-04-09 PROCEDURE — 97110 THERAPEUTIC EXERCISES: CPT | Performed by: PHYSICAL THERAPIST

## 2020-04-09 PROCEDURE — 97112 NEUROMUSCULAR REEDUCATION: CPT | Performed by: PHYSICAL THERAPIST

## 2020-04-09 NOTE — FLOWSHEET NOTE
10# OP   Calf stretch - towel prop/strap 5 x 30\"  Prop, 10# OP   Quad sets   Adductor ball squeeze    Hip abd isometric 10\" 10x Hooklying   SLR supine SLR supine w/ NMES Standing SLR flexion 3 10    Standing hip abd 3 10    LAQ w/ NMES Alt - SAQ & HS isometric    eob knee flexion (ll/ld)  Knee ext ll/ld    HEPAnkle DF ll/ld  6' Incline board; lean into stretch   Wall slide - supine ERMI - ankle/knee flexionator 1' 8x          Weight shifting 10\" 10x R Heel on 2\" step; L on 2\" step; 25% WB   Gait Training  5' Boot w/ heel wedge; B/L crutches         Education                         Manual Intervention (63279)      Prom/stm - ankle;   15'                                                                                                                                             Therapeutic Exercise and NMR EXR  [x] (08721) Provided verbal/tactile cueing for activities related to strengthening, flexibility, endurance, ROM for improvements in LE, proximal hip, and core control with self care, mobility, lifting, ambulation. [x] (11861) Provided verbal/tactile cueing for activities related to improving balance, coordination, kinesthetic sense, posture, motor skill, proprioception to assist with LE, proximal hip, and core control in self-care, mobility, lifting, ambulation and eccentric single leg control.      NMR and Therapeutic Activities:    [x] (86678 or 35691) Provided verbal/tactile cueing for activities related to improving balance, coordination, kinesthetic sense, posture, motor skill, proprioception and motor activation to allow for proper function of core, proximal hip and LE with self-care and ADLs and functional mobility.   [] (10373) Gait Re-education- Provided training and instruction to the patient for proper LE, core and proximal hip recruitment and positioning and eccentric body weight control with ambulation re-education including up and down stairs     Home Exercise Program:    [x] (94919) Reviewed/Progressed HEP activities related to strengthening, flexibility, endurance, ROM of core, proximal hip and LE for functional self-care, mobility, lifting and ambulation/stair navigation   [] (66974) Reviewed/Progressed HEP activities related to improving balance, coordination, kinesthetic sense, posture, motor skill, proprioception of core, proximal hip and LE for self-care, mobility, lifting, and ambulation/stair navigation      Manual Treatments:  PROM / STM / Oscillations-Mobs:  G-I, II, III, IV (PA's, Inf., Post.)  [x] (89231) Provided manual therapy to mobilize LE, proximal hip and/or LS spine soft tissue/joints for the purpose of modulating pain, promoting relaxation, increasing ROM, reducing/eliminating soft tissue swelling/inflammation/restriction, improving soft tissue extensibility and allowing for proper ROM for normal function with self-care, mobility, lifting and ambulation. Modalities:  Ice 15' (end)   [] GAME READY (VASO)- for significant edema, swelling, pain control. Charges:  Timed Code Treatment Minutes: 54'    Total Treatment Minutes: 55'    11:00 - 12:15     [] EVAL (LOW) 84746 (typically 20 minutes face-to-face)  [] EVAL (MOD) 16002 (typically 30 minutes face-to-face)  [] EVAL (HIGH) 77133 (typically 45 minutes face-to-face)  [] RE-EVAL     [x] EZ(39358) x   2  [] IONTO  [x] NMR (90470) x 1    [] VASO  [x] Manual (04420) x 1     [] Other:  [] TA x      [] Mech Traction (88090)  [] ES(attended) (75502)      [] ES (un) (26785):         ASSESSMENT:        GOALS:   Patient stated goal:    [] Progressing: [] Met: [] Not Met: [] Adjusted    Therapist goals for Patient:   Short Term Goals: To be achieved in: 2 weeks  1. Independent in HEP and progression per patient tolerance, in order to prevent re-injury. [] Progressing: [] Met: [] Not Met: [] Adjusted   2.  Patient will have a decrease in pain to facilitate improvement in movement, function, and ADLs as indicated by Functional Deficits. [] Progressing: [] Met: [] Not Met: [] Adjusted    Long Term Goals: To be achieved in:   12  weeks  - The patient is expected to demonstrate less than  50% impairment, limitation or restriction in:  - walking and moving around (mobility)  - Patient will demonstrate increased passive and active ROM right knee to 130 degrees  to allow for proper joint functioning as indicated by patients Functional Deficits. [] Progressing: [] Met: [] Not Met: [] Adjusted  - Patient will demonstrate an increase in Strength to 4/5 knee to allow for proper functional mobility as indicated by patients Functional Deficits. [] Progressing: [] Met: [] Not Met: [] Adjusted  - Patient will return to desired, higher level upper extremity functional activities without increased symptoms or restriction. [] Progressing: [] Met: [] Not Met: [] Adjusted          Overall Progression Towards Functional goals/ Treatment Progress Update:  [] Patient is progressing as expected towards functional goals listed. [] Progression is slowed due to complexities/Impairments listed. [] Progression has been slowed due to co-morbidities. [x] Plan just implemented, too soon to assess goals progression <30days   [] Goals require adjustment due to lack of progress  [] Patient is not progressing as expected and requires additional follow up with physician  [] Other    Prognosis for POC: [x] Good [] Fair  [] Poor      Patient requires continued skilled intervention: [x] Yes  [] No    Treatment/Activity Tolerance:  [x] Patient able to complete treatment  [] Patient limited by fatigue  [] Patient limited by pain    [] Patient limited by other medical complications  [x] Other: Pt's flexion ROM remained about the same, pt noted that knee felt more stiff and sore with flexion ROM today. Likely d/t some knee swelling/soreness with progression to weight bearing. Pt able to demonstrate safe gait technique with step-to pattern with B/L crutches.  Pt had

## 2020-04-14 ENCOUNTER — HOSPITAL ENCOUNTER (OUTPATIENT)
Dept: PHYSICAL THERAPY | Age: 20
Setting detail: THERAPIES SERIES
Discharge: HOME | End: 2020-04-14
Payer: COMMERCIAL

## 2020-04-14 ENCOUNTER — HOSPITAL ENCOUNTER (OUTPATIENT)
Dept: PHYSICAL THERAPY | Age: 20
Setting detail: THERAPIES SERIES
End: 2020-04-14
Payer: COMMERCIAL

## 2020-04-14 PROCEDURE — 97110 THERAPEUTIC EXERCISES: CPT | Performed by: PHYSICAL THERAPIST

## 2020-04-14 PROCEDURE — 97140 MANUAL THERAPY 1/> REGIONS: CPT | Performed by: PHYSICAL THERAPIST

## 2020-04-15 ENCOUNTER — OFFICE VISIT (OUTPATIENT)
Dept: ORTHOPEDIC SURGERY | Age: 20
End: 2020-04-15
Payer: COMMERCIAL

## 2020-04-15 ENCOUNTER — TELEPHONE (OUTPATIENT)
Dept: ORTHOPEDIC SURGERY | Age: 20
End: 2020-04-15

## 2020-04-15 VITALS — HEIGHT: 77 IN | BODY MASS INDEX: 28.34 KG/M2 | WEIGHT: 240 LBS

## 2020-04-15 PROCEDURE — 99213 OFFICE O/P EST LOW 20 MIN: CPT | Performed by: ORTHOPAEDIC SURGERY

## 2020-04-15 NOTE — LETTER
[facility]  Surgery Precert & Billing Form:    DEMOGRAPHICS:                                                                                                       Patient Name:  Betsy Daly  Patient :  2000   Patient SS#:      Patient Phone:  [phone] (home)  Alt. Patient Phone:    Patient Address:  [address] [address]    PCP:  No primary care provider on file.   Insurance: [employer]    DIAGNOSIS & PROCEDURE:                                                                                      Diagnosis: S83.511A, [id number], [id number], S83.231A  Operation: right    SURGERY  INFORMATION  Date of Surgery:   2020   Location:    [facility]   Type:    Outpatient  23 hour hold:  No  Surgeon:         Caryl Kohli MD  4/15/20     BILLING INFORMATION:                                                                                                Physician Procedure                                            CPT Codes                      PA, or Fellow Procedure                                      CPT Codes                      Precert information:

## 2020-04-15 NOTE — FLOWSHEET NOTE
HS isometric    eob knee flexion (ll/ld)  Knee ext ll/ld    HEPAnkle DF ll/ld  6' Incline board; lean into stretch   Wall slide - supine ERMI - ankle/knee flexionator 1' 8x          Weight shifting 10\" 10x R Heel on 2\" step; L on 2\" step; 25% WB   Gait Training  5' Boot w/ heel wedge; B/L crutches         Education                         Manual Intervention (23505)      Prom/stm - ankle;   15'                                                                                                                                             Therapeutic Exercise and NMR EXR  [x] (22950) Provided verbal/tactile cueing for activities related to strengthening, flexibility, endurance, ROM for improvements in LE, proximal hip, and core control with self care, mobility, lifting, ambulation. [x] (01229) Provided verbal/tactile cueing for activities related to improving balance, coordination, kinesthetic sense, posture, motor skill, proprioception to assist with LE, proximal hip, and core control in self-care, mobility, lifting, ambulation and eccentric single leg control.      NMR and Therapeutic Activities:    [x] (33524 or 59092) Provided verbal/tactile cueing for activities related to improving balance, coordination, kinesthetic sense, posture, motor skill, proprioception and motor activation to allow for proper function of core, proximal hip and LE with self-care and ADLs and functional mobility.   [] (21663) Gait Re-education- Provided training and instruction to the patient for proper LE, core and proximal hip recruitment and positioning and eccentric body weight control with ambulation re-education including up and down stairs     Home Exercise Program:    [x] (10899) Reviewed/Progressed HEP activities related to strengthening, flexibility, endurance, ROM of core, proximal hip and LE for functional self-care, mobility, lifting and ambulation/stair navigation   [] (84869) Reviewed/Progressed HEP activities related to improving demonstrate increased passive and active ROM right knee to 130 degrees  to allow for proper joint functioning as indicated by patients Functional Deficits. [] Progressing: [] Met: [] Not Met: [] Adjusted  - Patient will demonstrate an increase in Strength to 4/5 knee to allow for proper functional mobility as indicated by patients Functional Deficits. [] Progressing: [] Met: [] Not Met: [] Adjusted  - Patient will return to desired, higher level upper extremity functional activities without increased symptoms or restriction. [] Progressing: [] Met: [] Not Met: [] Adjusted          Overall Progression Towards Functional goals/ Treatment Progress Update:  [] Patient is progressing as expected towards functional goals listed. [] Progression is slowed due to complexities/Impairments listed. [] Progression has been slowed due to co-morbidities. [x] Plan just implemented, too soon to assess goals progression <30days   [] Goals require adjustment due to lack of progress  [] Patient is not progressing as expected and requires additional follow up with physician  [] Other    Prognosis for POC: [x] Good [] Fair  [] Poor      Patient requires continued skilled intervention: [x] Yes  [] No    Treatment/Activity Tolerance:  [x] Patient able to complete treatment  [] Patient limited by fatigue  [] Patient limited by pain    [] Patient limited by other medical complications  [x] Other: Pt's flexion ROM remained about the same, pt noted that knee felt more stiff and sore with flexion ROM today. Likely d/t some knee swelling/soreness with progression to weight bearing. Pt able to demonstrate safe gait technique with step-to pattern with B/L crutches. Pt had reduced balance control when attempting a step-through pattern. Demonstrated good knee stability with light weight bearing (25-50%). Fatigued quickly with standing SLR. Less quad lag with standing SLR flexion compared to supine.  Discussed and attempted alternative DF ROM stretching with light weight bearing to tolerance. Pt will be getting dynasplint for DF tomorrow, which will help immensely with his DF ROM and gait mechanics. Return to Play: (if applicable)   []  Stage 1: Intro to Strength   []  Stage 2: Return to Run and Strength   []  Stage 3: Return to Jump and Strength   []  Stage 4: Dynamic Strength and Agility   []  Stage 5: Sport Specific Training     []  Ready to Return to Play, Meets All Above Stages   []  Not Ready for Return to Sports   Comments:                               PLAN: See eval  [x] Continue per plan of care [] Alter current plan (see comments above)  [] Plan of care initiated [] Hold pending MD visit [] Discharge      Electronically signed by:  Clarke Johnson, PT, MPT   Note: If patient does not return for scheduled/ recommended follow up visits, this note will serve as a discharge from care along with most recent update on progress.

## 2020-04-15 NOTE — PROGRESS NOTES
MD Zaheer Moraes, [place]         Orthopaedic Surgery and Sports Medicine      Patient Name: Kike Yo  YOB: 2000  Patient's PCP is No primary care provider on file. SUBJECTIVE  Chief Complaint:  Knee Pain (right)      History of Present Illness:  Kike Yo is a 22 y.o. male here regarding right knee pain. Is a gentleman that we first saw on 03/18/2020 as a transfer from [place]. His history is documented in the first couple of paragraphs below. This is a 17-year-old gentleman who was in [place] this December 2019 and was running and jumped over a chain landing on his right knee and sustained an immediate tibiofemoral dislocation. Was seen emergently and found to have both avascular and neurologic injury to his knee. The knee was reduced and he was immediately taken to surgery for the vascular injury. The date of his injury was 12/27/2019 and his surgery was performed early in the morning on 12/28/2019. Again, the initial procedure was a vascular reconstruction posterior to the right knee. At that time, he also had placement of an external fixator for stabilization of the knee. In addition to the external fixator, he had of lower leg fasciotomy with both medial and lateral fasciotomies performed. He remained in the external fixator and he remained in [place] until the second week of March 2020. The patient is currently ambulating the assistance of 2 crutches and is WBAT on the extremity. He rates pain at 4/10. Since his initial visit with us, he has been in outpatient physical therapy working on range of motion of his knee and range of motion of his ankle. Both are improving but very slowly.     At his last visit we discontinued the use of his T ROM brace and got him into a tall walking boot with a heel lift which he has been wearing reading is pretty consistent with his physical examination. MRI of the right knee from 03/21/2020 shows an ACL, PCL, MCL, BALJIT tears and posterior capsule injury. There is also a tear of the medial meniscus. ASSESSMENT (Medical Decision Making)    Chelsie Blanchard is a 22 y.o. male with the following diagnosis: Status post a right knee severe dislocation which occurred 12/27/2020 in [place]. He has undergone revascularization on an emergent basis on 12/28/2020. He had a fasciotomy performed of the right lower extremity and he had placement of an external fixator which was just removed recently. He has returned to [place] and started outpatient physical therapy and has shown significant improvement although still limited range of motion of his knee and his ankle. His overall course is responding adequately to ongoing treatment      PLAN (Medical Decision Making)  Office Procedures:  No orders of the defined types were placed in this encounter. Treatment Plan:    I discussed the diagnosis and treatment options with Chelsie Blanchard today    Today, we would like the patient to remain in his high walking boot with wedge however as his range of motion of his ankle increases that wedge size can be decreased. We will ask physical therapy to help with that. We also discussed his further surgical plans today. We would most likely begin with a video arthroscopy exam under anesthesia with debridement and possible medial meniscal repair. We would also at that time likely manipulate both the knee and ankle to help with range of motion. We would want to do this surgery prior to any big reconstructive surgery. He also is going to need vascular clearance before any surgical intervention. After discussion today, the patient has elected to proceed with surgical intervention. The surgical procedure was discussed in detail.  The risks and possible complications of the surgery in general, as well as those

## 2020-04-15 NOTE — LETTER
[facility]  Surgery Scheduling Form      Patient Name:  Jaci Miramontes  Patient :  2000   Patient SS#:      Patient Phone:  [phone] (home)    Patient Address:  [address] [place] [address]    PCP:  No primary care provider on file. OP      Insurance:  Payor: Adrián Vaughan / Plan: [facility] - [place] PPO / Product Type: *No Product type* /   Insurance ID Number:   [id number]     Diagnosis: Z26.809H, [id number], E38.209X, [id number]  Operation: Right knee exam under anesthesia, arthroscopic debridement of torn ACL and PCL. Possible meniscus repair. Manipulation of right knee. Manipulation of right ankle. Location:  [facility]   Surgeon:  Clara Hayes M.D. Date:  2020 OR Time:    7:30am        Time Required:  60mins    Anesthesia: General and block. Equipment/Rep:  n/a  Classification: Outpatient  PAT Required:  NO  CARDIAC CLEARANCE: Vascular clearance     Comments/Special Request: n/a        4/15/2020 11:31 AM                                                                       In accordance with our formulary system, a generic equivalent drug may be dispensed and administered unless \"D. A. W\" is written with the medication order.    PRE-SURGICAL PHYSICIAN ORDERS             [] Inpatient  [x] Outpatient   Preop Antibiotic Prophylaxsis / DAY OF SURGERY     [x]   Cefazolin IVPB per weight base protocol If allergic to Cephalosporins   * Cefazolin 2 gram if < 119.9 kg []   Clindamycin 600mg IVPB    * Cefazolin 3 grams if > 120 kg ( > 264 lbs)        *Pediatric(<14yo) Dosinmg/kg (maximum 2 grams) If  MRSA positive:    []   Vancomycin IVPB per weight base protocol    * Vancomycin 1gm if < 80 Kg (<176 lbs)    * Vancomycin 1.5 gm if  Kg (176-264 lbs)    * Vancomycin 2 gm IVPB if >120 Kg (>264 bs)     ADDITIONAL MEDICATIONS:        []      Tranaxemic Acid: 15mg/kg up to a maximum of 2000mg              DVT PREVENTION: []   Knee high Antithrombic wraps Bilateral Right Left     []  Thigh high Antithrombic wraps Bilateral Right Left     [x]  Thigh high AMBERLY hose Bilateral Right Left      Signature          Date: 4/15/2020   Lauren Dee  : 2000                                                                                             Dear:     Jagruti Rees have been scheduled for surgery on your    At [place] [address]    On:    You will need to arrive at the hospital at: You will need to make an appointment to see your primary care physician prior to surgery for a presurgical history and physical. I have enclosed the paperwork you will need to take to this appointment. Please bring the completed form with you on the day of surgery and your primary care physician can fax to the hospital at [phone]. You will have to have lab work done at the hospital @ one week before surgery. The hospital will contact you to set this up. If you see a cardiologist, you will be required to bring a written clearance for surgery from the cardiologist.    DO NOT EAT OR DRINK ANYTHING AFTER MIDNIGHT, THE NIGHT BEFORE SURGERY. It is your responsibility to know what your insurance benefits are and what your out-of-pocket expenses may be. It is recommended that you verify your benefits with your insurance company prior to surgery. Your first physical therapy appointment is: Your first postop appointment is:    Please call [phone] ext. [phone], if you have any questions or concerns regarding the surgery or the information in this letter. Thank you.     Carley Dove,  to Domi Tijerina MD

## 2020-04-16 ENCOUNTER — APPOINTMENT (OUTPATIENT)
Dept: PHYSICAL THERAPY | Age: 20
End: 2020-04-16
Payer: COMMERCIAL

## 2020-04-16 ENCOUNTER — HOSPITAL ENCOUNTER (OUTPATIENT)
Dept: PHYSICAL THERAPY | Age: 20
Setting detail: THERAPIES SERIES
Discharge: HOME | End: 2020-04-16
Payer: COMMERCIAL

## 2020-04-16 PROCEDURE — 97140 MANUAL THERAPY 1/> REGIONS: CPT | Performed by: PHYSICAL THERAPIST

## 2020-04-16 PROCEDURE — 97110 THERAPEUTIC EXERCISES: CPT | Performed by: PHYSICAL THERAPIST

## 2020-04-16 PROCEDURE — 97112 NEUROMUSCULAR REEDUCATION: CPT | Performed by: PHYSICAL THERAPIST

## 2020-04-16 NOTE — FLOWSHEET NOTE
and LE for self-care, mobility, lifting, and ambulation/stair navigation      Manual Treatments:  PROM / STM / Oscillations-Mobs:  G-I, II, III, IV (PA's, Inf., Post.)  [x] (03858) Provided manual therapy to mobilize LE, proximal hip and/or LS spine soft tissue/joints for the purpose of modulating pain, promoting relaxation, increasing ROM, reducing/eliminating soft tissue swelling/inflammation/restriction, improving soft tissue extensibility and allowing for proper ROM for normal function with self-care, mobility, lifting and ambulation. Modalities:  Ice 15' (end)   [] GAME READY (VASO)- for significant edema, swelling, pain control. Charges:  Timed Code Treatment Minutes: 48'    Total Treatment Minutes: 48'         [] EVAL (LOW) 96965 (typically 20 minutes face-to-face)  [] EVAL (MOD) 45983 (typically 30 minutes face-to-face)  [] EVAL (HIGH) 82648 (typically 45 minutes face-to-face)  [] RE-EVAL     [x] HV(68839) x   1  [] IONTO  [x] NMR (68503) x 1    [] VASO  [x] Manual (73446) x 1     [] Other:  [] TA x      [] Mech Traction (07288)  [] ES(attended) (38960)      [] ES (un) (53701):         ASSESSMENT:        GOALS:   Patient stated goal:    [] Progressing: [] Met: [] Not Met: [] Adjusted    Therapist goals for Patient:   Short Term Goals: To be achieved in: 2 weeks  1. Independent in HEP and progression per patient tolerance, in order to prevent re-injury. [] Progressing: [] Met: [] Not Met: [] Adjusted   2. Patient will have a decrease in pain to facilitate improvement in movement, function, and ADLs as indicated by Functional Deficits. [] Progressing: [] Met: [] Not Met: [] Adjusted    Long Term Goals:  To be achieved in:   12  weeks  - The patient is expected to demonstrate less than  50% impairment, limitation or restriction in:  - walking and moving around (mobility)  - Patient will demonstrate increased passive and active ROM right knee to 130 degrees  to allow for proper joint functioning will help immensely with his DF ROM and gait mechanics. Return to Play: (if applicable)   []  Stage 1: Intro to Strength   []  Stage 2: Return to Run and Strength   []  Stage 3: Return to Jump and Strength   []  Stage 4: Dynamic Strength and Agility   []  Stage 5: Sport Specific Training     []  Ready to Return to Play, Meets All Above Stages   []  Not Ready for Return to Sports   Comments:                               PLAN: See eval  [x] Continue per plan of care [] Alter current plan (see comments above)  [] Plan of care initiated [] Hold pending MD visit [] Discharge      Electronically signed by:  Alvarado Kerr, PT, MPT   Note: If patient does not return for scheduled/ recommended follow up visits, this note will serve as a discharge from care along with most recent update on progress.

## 2020-04-21 ENCOUNTER — HOSPITAL ENCOUNTER (OUTPATIENT)
Dept: PHYSICAL THERAPY | Age: 20
Setting detail: THERAPIES SERIES
Discharge: HOME | End: 2020-04-21
Payer: COMMERCIAL

## 2020-04-21 PROCEDURE — 97110 THERAPEUTIC EXERCISES: CPT | Performed by: PHYSICAL THERAPIST

## 2020-04-21 PROCEDURE — 97112 NEUROMUSCULAR REEDUCATION: CPT | Performed by: PHYSICAL THERAPIST

## 2020-04-21 PROCEDURE — 97140 MANUAL THERAPY 1/> REGIONS: CPT | Performed by: PHYSICAL THERAPIST

## 2020-04-22 RX ORDER — ASPIRIN/CALCIUM/MAG/ALUMINUM 325 MG
325 TABLET ORAL DAILY
COMMUNITY

## 2020-04-22 NOTE — FLOWSHEET NOTE
The [address] [place] [address] and Sports Rehabilitation      Physical Therapy Treatment Note/ Progress Report:           Date:  2020  Patient Name:  João Goldberg    :  2000  MRN: [id number]  Restrictions/Precautions:    Medical/Treatment Diagnosis Information:  · Diagnosis: R52 (ICD-10-CM) - Pain  · Treatment Diagnosis: right knee pain - m25.561  · S/P R Knee Dislocation; vascular reconstruction; external fixator removed - ACL/PCL/LCL deficient  Insurance/Certification information:  PT Insurance Information: [employer]   Physician Information:  Referring Practitioner: dr Hernandez Half   Has the plan of care been signed (Y/N):        []? Yes                    [x]?   No         Date of Patient follow up with Physician:       Is this a Progress Report:     []  Yes  [x]  No        If Yes:  Date Range for reporting period:  Beginning   Ending     Progress report will be due (10 Rx or 30 days whichever is less):       Recertification will be due (POC Duration  / 90 days whichever is less):         Visit # Insurance Allowable Auth Required   9 Pending  []  Yes []  No        Functional Scale:     Date assessed:        Latex Allergy:  [x]NO      []YES  Preferred Language for Healthcare:   [x]English       []other:      Pain level:  5/10     SUBJECTIVE: \"surgery next Tuesday\"    OBJECTIVE:    Observation:    Test measurements:      RESTRICTIONS/PRECAUTIONS:     Exercises/Interventions:       Therapeutic Ex (02850) Sets/sec Reps Notes/CUES   HS stretch - towel  prop 5 x 30\"  Prop, 10# OP   Calf stretch - towel prop/strap 5 x 30\"  Prop, 10# OP   Quad sets   Adductor ball squeeze    Hip abd isometric 10\" 10x Hooklying   SLR supine SLR supine w/ NMES Standing SLR flexion 3 10    Standing hip abd 3 10    LAQ w/ NMES Alt - SAQ & HS isometric    eob knee flexion (ll/ld)  Knee ext ll/ld    HEPAnkle DF ll/ld  6' Incline board; lean into stretch   Wall slide - supine ERMI - ankle/knee flexionator 1' 8x 2 sets Functional Deficits. [x] Progressing: [] Met: [] Not Met: [] Adjusted  - Patient will demonstrate an increase in Strength to 4/5 knee to allow for proper functional mobility as indicated by patients Functional Deficits. [x] Progressing: [] Met: [] Not Met: [] Adjusted  - Patient will return to desired, higher level upper extremity functional activities without increased symptoms or restriction. [x] Progressing: [] Met: [] Not Met: [] Adjusted          Overall Progression Towards Functional goals/ Treatment Progress Update:  [] Patient is progressing as expected towards functional goals listed. [] Progression is slowed due to complexities/Impairments listed. [] Progression has been slowed due to co-morbidities. [x] Plan just implemented, too soon to assess goals progression <30days   [] Goals require adjustment due to lack of progress  [] Patient is not progressing as expected and requires additional follow up with physician  [] Other    Prognosis for POC: [x] Good [] Fair  [] Poor      Patient requires continued skilled intervention: [x] Yes  [] No    Treatment/Activity Tolerance:  [x] Patient able to complete treatment  [] Patient limited by fatigue  [] Patient limited by pain    [] Patient limited by other medical complications  [x] Other: Pt's flexion ROM remained about the same, pt noted that knee felt more stiff and sore with flexion ROM today. Likely d/t some knee swelling/soreness with progression to weight bearing. Pt able to demonstrate safe gait technique with step-to pattern with B/L crutches. Pt had reduced balance control when attempting a step-through pattern. Demonstrated good knee stability with light weight bearing (25-50%). Fatigued quickly with standing SLR. Less quad lag with standing SLR flexion compared to supine. Discussed and attempted alternative DF ROM stretching with light weight bearing to tolerance.  Pt will be getting dynasplint for DF tomorrow, which will help immensely with his DF ROM and gait mechanics. Return to Play: (if applicable)   []  Stage 1: Intro to Strength   []  Stage 2: Return to Run and Strength   []  Stage 3: Return to Jump and Strength   []  Stage 4: Dynamic Strength and Agility   []  Stage 5: Sport Specific Training     []  Ready to Return to Play, Meets All Above Stages   []  Not Ready for Return to Sports   Comments:                               PLAN: See eval  [x] Continue per plan of care [] Alter current plan (see comments above)  [] Plan of care initiated [] Hold pending MD visit [] Discharge      Electronically signed by:  Kala Renteria PT, MPT   Note: If patient does not return for scheduled/ recommended follow up visits, this note will serve as a discharge from care along with most recent update on progress.

## 2020-04-23 ENCOUNTER — HOSPITAL ENCOUNTER (OUTPATIENT)
Dept: PHYSICAL THERAPY | Age: 20
Setting detail: THERAPIES SERIES
Discharge: HOME | End: 2020-04-23
Payer: COMMERCIAL

## 2020-04-23 PROCEDURE — 97140 MANUAL THERAPY 1/> REGIONS: CPT | Performed by: PHYSICAL THERAPIST

## 2020-04-23 PROCEDURE — 97112 NEUROMUSCULAR REEDUCATION: CPT | Performed by: PHYSICAL THERAPIST

## 2020-04-23 PROCEDURE — 97110 THERAPEUTIC EXERCISES: CPT | Performed by: PHYSICAL THERAPIST

## 2020-04-27 ENCOUNTER — ANESTHESIA EVENT (OUTPATIENT)
Dept: OPERATING ROOM | Age: 20
End: 2020-04-27
Payer: COMMERCIAL

## 2020-04-28 ENCOUNTER — HOSPITAL ENCOUNTER (OUTPATIENT)
Age: 20
Setting detail: OUTPATIENT SURGERY
Discharge: HOME | End: 2020-04-28
Attending: ORTHOPAEDIC SURGERY | Admitting: ORTHOPAEDIC SURGERY
Payer: COMMERCIAL

## 2020-04-28 ENCOUNTER — ANESTHESIA (OUTPATIENT)
Dept: OPERATING ROOM | Age: 20
End: 2020-04-28
Payer: COMMERCIAL

## 2020-04-28 VITALS
WEIGHT: 230 LBS | TEMPERATURE: 97 F | DIASTOLIC BLOOD PRESSURE: 75 MMHG | RESPIRATION RATE: 15 BRPM | SYSTOLIC BLOOD PRESSURE: 143 MMHG | BODY MASS INDEX: 27.16 KG/M2 | OXYGEN SATURATION: 97 % | HEART RATE: 74 BPM | HEIGHT: 77 IN

## 2020-04-28 VITALS — OXYGEN SATURATION: 97 % | TEMPERATURE: 97.2 F | SYSTOLIC BLOOD PRESSURE: 97 MMHG | DIASTOLIC BLOOD PRESSURE: 52 MMHG

## 2020-04-28 PROCEDURE — C1769 GUIDE WIRE: HCPCS | Performed by: ORTHOPAEDIC SURGERY

## 2020-04-28 PROCEDURE — 3700000000 HC ANESTHESIA ATTENDED CARE: Performed by: ORTHOPAEDIC SURGERY

## 2020-04-28 PROCEDURE — 2500000003 HC RX 250 WO HCPCS: Performed by: NURSE ANESTHETIST, CERTIFIED REGISTERED

## 2020-04-28 PROCEDURE — 64447 NJX AA&/STRD FEMORAL NRV IMG: CPT | Performed by: ANESTHESIOLOGY

## 2020-04-28 PROCEDURE — 7100000011 HC PHASE II RECOVERY - ADDTL 15 MIN: Performed by: ORTHOPAEDIC SURGERY

## 2020-04-28 PROCEDURE — 2709999900 HC NON-CHARGEABLE SUPPLY: Performed by: ORTHOPAEDIC SURGERY

## 2020-04-28 PROCEDURE — 2720000010 HC SURG SUPPLY STERILE: Performed by: ORTHOPAEDIC SURGERY

## 2020-04-28 PROCEDURE — 3600000004 HC SURGERY LEVEL 4 BASE: Performed by: ORTHOPAEDIC SURGERY

## 2020-04-28 PROCEDURE — 2580000003 HC RX 258: Performed by: ORTHOPAEDIC SURGERY

## 2020-04-28 PROCEDURE — 3700000001 HC ADD 15 MINUTES (ANESTHESIA): Performed by: ORTHOPAEDIC SURGERY

## 2020-04-28 PROCEDURE — 6360000002 HC RX W HCPCS: Performed by: ANESTHESIOLOGY

## 2020-04-28 PROCEDURE — 2580000003 HC RX 258: Performed by: ANESTHESIOLOGY

## 2020-04-28 PROCEDURE — 7100000010 HC PHASE II RECOVERY - FIRST 15 MIN: Performed by: ORTHOPAEDIC SURGERY

## 2020-04-28 PROCEDURE — 3600000014 HC SURGERY LEVEL 4 ADDTL 15MIN: Performed by: ORTHOPAEDIC SURGERY

## 2020-04-28 PROCEDURE — 6360000002 HC RX W HCPCS: Performed by: NURSE ANESTHETIST, CERTIFIED REGISTERED

## 2020-04-28 PROCEDURE — 6360000002 HC RX W HCPCS: Performed by: ORTHOPAEDIC SURGERY

## 2020-04-28 PROCEDURE — 7100000001 HC PACU RECOVERY - ADDTL 15 MIN: Performed by: ORTHOPAEDIC SURGERY

## 2020-04-28 PROCEDURE — 7100000000 HC PACU RECOVERY - FIRST 15 MIN: Performed by: ORTHOPAEDIC SURGERY

## 2020-04-28 RX ORDER — LIDOCAINE HYDROCHLORIDE 10 MG/ML
2 INJECTION, SOLUTION INFILTRATION; PERINEURAL
Status: DISCONTINUED | OUTPATIENT
Start: 2020-04-28 | End: 2020-04-28 | Stop reason: HOSPADM

## 2020-04-28 RX ORDER — LIDOCAINE HYDROCHLORIDE 20 MG/ML
INJECTION, SOLUTION INFILTRATION; PERINEURAL PRN
Status: DISCONTINUED | OUTPATIENT
Start: 2020-04-28 | End: 2020-04-28 | Stop reason: SDUPTHER

## 2020-04-28 RX ORDER — DIPHENHYDRAMINE HYDROCHLORIDE 50 MG/ML
12.5 INJECTION INTRAMUSCULAR; INTRAVENOUS
Status: DISCONTINUED | OUTPATIENT
Start: 2020-04-28 | End: 2020-04-28 | Stop reason: HOSPADM

## 2020-04-28 RX ORDER — FENTANYL CITRATE 50 UG/ML
INJECTION, SOLUTION INTRAMUSCULAR; INTRAVENOUS PRN
Status: DISCONTINUED | OUTPATIENT
Start: 2020-04-28 | End: 2020-04-28 | Stop reason: SDUPTHER

## 2020-04-28 RX ORDER — MIDAZOLAM HYDROCHLORIDE 1 MG/ML
INJECTION INTRAMUSCULAR; INTRAVENOUS PRN
Status: DISCONTINUED | OUTPATIENT
Start: 2020-04-28 | End: 2020-04-28 | Stop reason: SDUPTHER

## 2020-04-28 RX ORDER — HYDRALAZINE HYDROCHLORIDE 20 MG/ML
5 INJECTION INTRAMUSCULAR; INTRAVENOUS EVERY 10 MIN PRN
Status: DISCONTINUED | OUTPATIENT
Start: 2020-04-28 | End: 2020-04-28 | Stop reason: HOSPADM

## 2020-04-28 RX ORDER — PROMETHAZINE HYDROCHLORIDE 25 MG/ML
6.25 INJECTION, SOLUTION INTRAMUSCULAR; INTRAVENOUS
Status: DISCONTINUED | OUTPATIENT
Start: 2020-04-28 | End: 2020-04-28 | Stop reason: HOSPADM

## 2020-04-28 RX ORDER — SODIUM CHLORIDE, SODIUM LACTATE, POTASSIUM CHLORIDE, CALCIUM CHLORIDE 600; 310; 30; 20 MG/100ML; MG/100ML; MG/100ML; MG/100ML
INJECTION, SOLUTION INTRAVENOUS CONTINUOUS
Status: DISCONTINUED | OUTPATIENT
Start: 2020-04-28 | End: 2020-04-28 | Stop reason: HOSPADM

## 2020-04-28 RX ORDER — SODIUM CHLORIDE, SODIUM LACTATE, POTASSIUM CHLORIDE, CALCIUM CHLORIDE 600; 310; 30; 20 MG/100ML; MG/100ML; MG/100ML; MG/100ML
INJECTION, SOLUTION INTRAVENOUS
Status: COMPLETED
Start: 2020-04-28 | End: 2020-04-28

## 2020-04-28 RX ORDER — MORPHINE SULFATE 2 MG/ML
1 INJECTION, SOLUTION INTRAMUSCULAR; INTRAVENOUS EVERY 5 MIN PRN
Status: DISCONTINUED | OUTPATIENT
Start: 2020-04-28 | End: 2020-04-28 | Stop reason: HOSPADM

## 2020-04-28 RX ORDER — SODIUM CHLORIDE, SODIUM LACTATE, POTASSIUM CHLORIDE, AND CALCIUM CHLORIDE .6; .31; .03; .02 G/100ML; G/100ML; G/100ML; G/100ML
IRRIGANT IRRIGATION PRN
Status: DISCONTINUED | OUTPATIENT
Start: 2020-04-28 | End: 2020-04-28 | Stop reason: ALTCHOICE

## 2020-04-28 RX ORDER — LABETALOL HYDROCHLORIDE 5 MG/ML
5 INJECTION, SOLUTION INTRAVENOUS EVERY 10 MIN PRN
Status: DISCONTINUED | OUTPATIENT
Start: 2020-04-28 | End: 2020-04-28 | Stop reason: HOSPADM

## 2020-04-28 RX ORDER — OXYCODONE HYDROCHLORIDE AND ACETAMINOPHEN 5; 325 MG/1; MG/1
1 TABLET ORAL EVERY 6 HOURS PRN
Qty: 28 TABLET | Refills: 0 | Status: SHIPPED | OUTPATIENT
Start: 2020-04-28 | End: 2020-05-05

## 2020-04-28 RX ORDER — DEXAMETHASONE SODIUM PHOSPHATE 10 MG/ML
INJECTION INTRAMUSCULAR; INTRAVENOUS PRN
Status: DISCONTINUED | OUTPATIENT
Start: 2020-04-28 | End: 2020-04-28 | Stop reason: SDUPTHER

## 2020-04-28 RX ORDER — PROPOFOL 10 MG/ML
INJECTION, EMULSION INTRAVENOUS PRN
Status: DISCONTINUED | OUTPATIENT
Start: 2020-04-28 | End: 2020-04-28 | Stop reason: SDUPTHER

## 2020-04-28 RX ORDER — ONDANSETRON 2 MG/ML
INJECTION INTRAMUSCULAR; INTRAVENOUS PRN
Status: DISCONTINUED | OUTPATIENT
Start: 2020-04-28 | End: 2020-04-28 | Stop reason: SDUPTHER

## 2020-04-28 RX ORDER — ONDANSETRON 2 MG/ML
4 INJECTION INTRAMUSCULAR; INTRAVENOUS PRN
Status: DISCONTINUED | OUTPATIENT
Start: 2020-04-28 | End: 2020-04-28 | Stop reason: HOSPADM

## 2020-04-28 RX ORDER — OXYCODONE HYDROCHLORIDE AND ACETAMINOPHEN 5; 325 MG/1; MG/1
2 TABLET ORAL PRN
Status: DISCONTINUED | OUTPATIENT
Start: 2020-04-28 | End: 2020-04-28 | Stop reason: HOSPADM

## 2020-04-28 RX ORDER — OXYCODONE HYDROCHLORIDE AND ACETAMINOPHEN 5; 325 MG/1; MG/1
1 TABLET ORAL PRN
Status: DISCONTINUED | OUTPATIENT
Start: 2020-04-28 | End: 2020-04-28 | Stop reason: HOSPADM

## 2020-04-28 RX ORDER — MORPHINE SULFATE 2 MG/ML
2 INJECTION, SOLUTION INTRAMUSCULAR; INTRAVENOUS EVERY 5 MIN PRN
Status: DISCONTINUED | OUTPATIENT
Start: 2020-04-28 | End: 2020-04-28 | Stop reason: HOSPADM

## 2020-04-28 RX ADMIN — ONDANSETRON 4 MG: 2 INJECTION INTRAMUSCULAR; INTRAVENOUS at 07:37

## 2020-04-28 RX ADMIN — CEFAZOLIN 2 G: 10 INJECTION, POWDER, FOR SOLUTION INTRAVENOUS at 07:27

## 2020-04-28 RX ADMIN — DEXAMETHASONE SODIUM PHOSPHATE 8 MG: 10 INJECTION INTRAMUSCULAR; INTRAVENOUS at 07:37

## 2020-04-28 RX ADMIN — PROPOFOL 200 MG: 10 INJECTION, EMULSION INTRAVENOUS at 07:31

## 2020-04-28 RX ADMIN — MIDAZOLAM HYDROCHLORIDE 2 MG: 2 INJECTION, SOLUTION INTRAMUSCULAR; INTRAVENOUS at 07:28

## 2020-04-28 RX ADMIN — LIDOCAINE HYDROCHLORIDE 60 MG: 20 INJECTION, SOLUTION INFILTRATION; PERINEURAL at 07:31

## 2020-04-28 RX ADMIN — SODIUM CHLORIDE, POTASSIUM CHLORIDE, SODIUM LACTATE AND CALCIUM CHLORIDE: 600; 310; 30; 20 INJECTION, SOLUTION INTRAVENOUS at 07:23

## 2020-04-28 RX ADMIN — FENTANYL CITRATE 100 MCG: 50 INJECTION INTRAMUSCULAR; INTRAVENOUS at 07:35

## 2020-04-28 RX ADMIN — HYDROMORPHONE HYDROCHLORIDE 0.5 MG: 1 INJECTION, SOLUTION INTRAMUSCULAR; INTRAVENOUS; SUBCUTANEOUS at 09:16

## 2020-04-28 ASSESSMENT — PULMONARY FUNCTION TESTS
PIF_VALUE: 3
PIF_VALUE: 2
PIF_VALUE: 3
PIF_VALUE: 2
PIF_VALUE: 4
PIF_VALUE: 0
PIF_VALUE: 4
PIF_VALUE: 3
PIF_VALUE: 2
PIF_VALUE: 3
PIF_VALUE: 1
PIF_VALUE: 3
PIF_VALUE: 1
PIF_VALUE: 3
PIF_VALUE: 4
PIF_VALUE: 3
PIF_VALUE: 3
PIF_VALUE: 1
PIF_VALUE: 3
PIF_VALUE: 0
PIF_VALUE: 3
PIF_VALUE: 0
PIF_VALUE: 3
PIF_VALUE: 2
PIF_VALUE: 3
PIF_VALUE: 4
PIF_VALUE: 3
PIF_VALUE: 4
PIF_VALUE: 3
PIF_VALUE: 3
PIF_VALUE: 1
PIF_VALUE: 3
PIF_VALUE: 4
PIF_VALUE: 3
PIF_VALUE: 1
PIF_VALUE: 3
PIF_VALUE: 13
PIF_VALUE: 3
PIF_VALUE: 1
PIF_VALUE: 3
PIF_VALUE: 2
PIF_VALUE: 1
PIF_VALUE: 3

## 2020-04-28 ASSESSMENT — PAIN SCALES - GENERAL
PAINLEVEL_OUTOF10: 7
PAINLEVEL_OUTOF10: 7

## 2020-04-28 NOTE — H&P
I have reviewed the history and physical and examined the patient and find no relevant changes. I have reviewed with the patient and/or family the risks, benefits, and alternatives to the procedure.     Leverne Kehr, MD  4/28/2020

## 2020-04-28 NOTE — ANESTHESIA POSTPROCEDURE EVALUATION
Department of Anesthesiology  Postprocedure Note    Patient: Tom Rahman  MRN: [id number]  YOB: 2000  Date of evaluation: 4/28/2020  Time:  2:21 PM     Procedure Summary     Date:  04/28/20 Room / Location:  [facility]    Anesthesia Start:  6945 Anesthesia Stop:  7119    Procedure:  RIGHT KNEE EXAM UNDER ANESTHESIA, ARTHROSCOPIC DEBRIDEMENT OF TORN ACL AND PCL MANIPULATION OF RIGHT KNEE. MANIPULATION OF RIGHT ANKEL (Right ) Diagnosis:       Sprain of anterior cruciate ligament of right knee, initial encounter      (RIGHT KNEE DISLOCATION, VASCULAR REPAIR,)    Surgeon:  Leverne Kehr, MD Responsible Provider:  Cha Carney MD    Anesthesia Type:  general ASA Status:  2          Anesthesia Type: general    Jasmine Phase I: Jasmine Score: 5    Jasmine Phase II: Jasmine Score: 9    Last vitals: Reviewed and per EMR flowsheets. Anesthesia Post Evaluation    Comments: Postoperative Anesthesia Note    Name:    Tom Rahman  MRN:      [id number]    Patient Vitals in the past 12 hrs:  04/28/20 0850, BP:(!) 143/75, Pulse:74, Resp:15, SpO2:97 %  04/28/20 0842, Pulse:84  04/28/20 0840, BP:(!) 147/68, Pulse:82, Resp:16, SpO2:98 %  04/28/20 0837, BP:138/66, Temp:97 °F (36.1 °C), Temp src:Temporal  04/28/20 0700, Pulse:84, SpO2:96 %  04/28/20 0601, Height:6' 5\" (1.956 m), Weight:230 lb (104.3 kg)     LABS:    CBC  No results found for: WBC, HGB, HCT, PLT  RENAL  No results found for: NA, K, CL, CO2, BUN, CREATININE, GLUCOSE  COAGS  No results found for: PROTIME, INR, APTT    Intake & Output:  @50GMVA@    Nausea & Vomiting:  No    Level of Consciousness:  Awake    Pain Assessment:  Adequate analgesia    Anesthesia Complications:  No apparent anesthetic complications    SUMMARY      Vital signs stable  OK to discharge from Stage I post anesthesia care.   Care transferred from Anesthesiology department on discharge from perioperative area

## 2020-04-28 NOTE — OP NOTE
Operative Note            MD Salina Kumar PA-C        Patient: Tom Rahman  YOB: 2000  MRN: [id number]    Date of Procedure: 4/28/2020    Pre-Op Diagnosis:   1)  RIGHT KNEE DISLOCATION, VASCULAR REPAIR, ACL TEAR, PCL TEAR, POSTERIOR CAPSULE TEAR, MEDIAL MENISCUS TEAR, POSTEROLATERAL CORNER TEAR, PERONEAL NERVE PALSY, RIGHT KNEE ARTHROFIBROSIS, RIGHT KNEE CONTRACTURE    2)  EQUINUS RIGHT FOOT CONTRACTURE    Post-Op Diagnosis: Same       Procedure(s):  1)  RIGHT KNEE EXAM UNDER ANESTHESIA, ARTHROSCOPIC LYSIS OF ADHESIONS OF TORN ACL AND PCL, POSSIBLE MENISCUS REPAIR. MANIPULATION OF RIGHT KNEE. 2)  MANIPULATION OF RIGHT ANKLE for EQUINUS CONTRACTURE    Surgeon(s):  Leverne Kehr, MD    Assistant -  Salina Bear PA-C  The Physician Assistants services included preoperative and postoperative assessment and documentation, patient positioning, prep and drape. Also the services included intraoperative positioning and retraction, closure and postoperative dressing and postoperative orders. This significantly facilitated the procedure, limiting operative times and the associated coexisting morbidities. No other MD assistance was available. This surgical procedure was assisted by my physician assistant. Her presence was needed throughout the case for manipulation and positioning of the extremity as well as positioning the surgical instruments and primarily assisting me through the technical part of this complex procedure. The skill set of an orthopaedic physician assistant was needed throughout the case. During the surgical case the surgical tech was working the back table and was not available for assistance.     Anesthesia: General WITH LOWER EXTREMITY BLOCK    Estimated Blood Loss (mL): Minimal    Complications: None    Indications for Operation    Is a young man who was involved in a very severe accident which resulted in a knee dislocation involving and draped in the normal, sterile fashion. Examination  Under Anesthesia:     His knee range of motion under anesthesia is about 5 degrees to 90 degrees. His ACL and PCL are unstable. His varus and valgus stress does not appear to be unstable especially his posterior lateral corner seems to be scarred and reasonably well and fairly intact. He does not have a positive drawer sign either. He has a very tight Achilles tendon his foot remains in equinus. Diagnostic Arthroscopy  A standard inferolateral portal was established. The trocar and cannula were inserted. The trocar was removed and the arthroscope and inflow inserted. A superior medial outflow portal was also established. The inferomedial portal was established under direct vision after localizing the joint with a spinal needle. A nerve hook was inserted and all relevant structures probed. Sytematic arthroscopy was performed and the findings are summarized below:   Patellofemoral Compartment-  The articular cartilage was intact    Medial Compartment-  The medial meniscus and articular cartilage were intact. Amazingly we can visualize his previous medial meniscus tear which was a radial tear between the posterior horn and the body which extended it appears almost to the periphery and that has completely healed all the way out through the white white zone. His injury was in late December so he has been 4 months nonweightbearing and immobilized and that resulted in healing of his meniscus.  Intercondylar Notch-The ACL was torn. The PCL was torn. There was complete mass of scar tissue within the intercondylar notch.       Lateral Compartment- Thel lateral meniscus and articular cartilage were intact there was some evidence of some fraying of the lateral white white portion of the meniscus and then there was also actually some widening of the lateral joint space you could tell that the posterior lateral corner had been disrupted at one

## 2020-04-29 ENCOUNTER — HOSPITAL ENCOUNTER (OUTPATIENT)
Dept: PHYSICAL THERAPY | Age: 20
Setting detail: THERAPIES SERIES
Discharge: HOME | End: 2020-04-29
Payer: COMMERCIAL

## 2020-04-29 PROCEDURE — 97164 PT RE-EVAL EST PLAN CARE: CPT | Performed by: PHYSICAL THERAPIST

## 2020-04-29 PROCEDURE — 97110 THERAPEUTIC EXERCISES: CPT | Performed by: PHYSICAL THERAPIST

## 2020-04-29 PROCEDURE — 97140 MANUAL THERAPY 1/> REGIONS: CPT | Performed by: PHYSICAL THERAPIST

## 2020-04-30 ENCOUNTER — HOSPITAL ENCOUNTER (OUTPATIENT)
Dept: PHYSICAL THERAPY | Age: 20
Setting detail: THERAPIES SERIES
Discharge: HOME | End: 2020-04-30
Payer: COMMERCIAL

## 2020-04-30 PROCEDURE — 97140 MANUAL THERAPY 1/> REGIONS: CPT | Performed by: PHYSICAL THERAPIST

## 2020-04-30 PROCEDURE — 97110 THERAPEUTIC EXERCISES: CPT | Performed by: PHYSICAL THERAPIST

## 2020-04-30 PROCEDURE — 97112 NEUROMUSCULAR REEDUCATION: CPT | Performed by: PHYSICAL THERAPIST

## 2020-05-01 NOTE — FLOWSHEET NOTE
is slowed due to complexities/Impairments listed. [] Progression has been slowed due to co-morbidities. [x] Plan just implemented, too soon to assess goals progression <30days   [] Goals require adjustment due to lack of progress  [] Patient is not progressing as expected and requires additional follow up with physician  [] Other    Prognosis for POC: [x] Good [] Fair  [] Poor      Patient requires continued skilled intervention: [x] Yes  [] No    Treatment/Activity Tolerance:  [x] Patient able to complete treatment  [] Patient limited by fatigue  [] Patient limited by pain    [] Patient limited by other medical complications  [x] Other: Pt's flexion ROM remained about the same, pt noted that knee felt more stiff and sore with flexion ROM today. Likely d/t some knee swelling/soreness with progression to weight bearing. Pt able to demonstrate safe gait technique with step-to pattern with B/L crutches. Pt had reduced balance control when attempting a step-through pattern. Demonstrated good knee stability with light weight bearing (25-50%). Fatigued quickly with standing SLR. Less quad lag with standing SLR flexion compared to supine. Discussed and attempted alternative DF ROM stretching with light weight bearing to tolerance. Pt will be getting dynasplint for DF tomorrow, which will help immensely with his DF ROM and gait mechanics.      Return to Play: (if applicable)   []  Stage 1: Intro to Strength   []  Stage 2: Return to Run and Strength   []  Stage 3: Return to Jump and Strength   []  Stage 4: Dynamic Strength and Agility   []  Stage 5: Sport Specific Training     []  Ready to Return to Play, Meets All Above Stages   []  Not Ready for Return to Sports   Comments:                               PLAN:   [x] Continue per plan of care [] Alter current plan (see comments above)  [] Plan of care initiated [] Hold pending MD visit [] Discharge      Electronically signed by:  Oneil Butterfield, PT, MPT   Note: If patient

## 2020-05-01 NOTE — FLOWSHEET NOTE
PT, MPT   Note: If patient does not return for scheduled/ recommended follow up visits, this note will serve as a discharge from care along with most recent update on progress.

## 2020-05-01 NOTE — PROGRESS NOTES
The [address] [place] [address] and Sports Rehabilitation                                                    Physical Therapy Re-Certification Plan of Care    Dear dr Marry Manning   ,    We had the pleasure of treating the following patient for physical therapy services at [address] [place] [address]. A summary of our findings can be found in the updated assessment below. This includes our plan of care. If you have any questions or concerns regarding these findings, please do not hesitate to contact me at the office phone number checked above. Thank you for the referral.       Physician Signature:_______________________________Date:__________________  By signing above (or electronic signature), therapists plan is approved by physician      Patient: Teresa Ziegler   : 2000   MRN: [id number]  Referring Physician:        Evaluation Date: 2020      Medical Diagnosis Information:  ·   Diagnosis: R52 (ICD-10-CM) - Pain  · Treatment Diagnosis: right knee pain - m25.561  S/P R Knee Dislocation; vascular reconstruction; external fixator removed - ACL/PCL/LCL deficient                                              Insurance information:  pending      Date Range:3/20 -  -     Total visits:  10      Disability - 44% lefs     SUBJECTIVE: \"I am feeling better already\"      Pain Scale: 6/10     OBJECTIVE:   Impairments:    ROM:  -5 to 83 flexion       Strength/Neuromuscular control:   3+/5 knee         MEDICARE CAP EXCEPTION DOCUMENTATION  I certify that this patient meets one of the below criteria necessary for becoming an exception to the Medicare cap on therapy services:  []  The patient has a condition that has a direct and significant impact on the need for therapy. (Significantly impacts the rate of recovery)  []  The patient has a complexity that has a direct and significant impact on the need for therapy.   (Significantly impacts the rate of recovery and is associated with a primary condition.)       []  The patient has associated variables that influence the amount of treatment to include:  Social support, self-efficacy/motivation, prognosis, time since onset/acuity. []  The patient has generalized musculoskeletal conditions or a condition affecting multiple sites that will have a direct impact on the rate of recovery. []  The patient had a prior episode of outpatient therapy during this calendar year for a different condition. []  The patient has a mental or cognitive disorder in addition to the condition being treated that will have a direct and significant impact on the rate of recovery. ASSESSMENT:    Response to Treatment:   - Patient is responding well to treatment and improvement is noted with regards to goals    Updated Functional deficiencies/Impairments: The patient demonstrated at least  43% but less than  45% impairment, limitation or restriction in:   - walking and moving around (mobility)   - Decreased LE ROM- Reduced overall functional mobility  - Decreased LE functional strength and neuromuscular control- Reduced overall functional mobility   - Pain/Difficulty with prolonged standing- Reduced overall functional mobility   - Pain/difficulty with transfers/bed mobility- Reduced overall functional mobility  - Reduced balance/proprioceptive control- Reduced overall functional level with mobility and gait  - Pain/difficulty with ambulation or prolonged ambulation- Reduced overall functional mobility  - Unable to perform self care tasks due to pain and dysfunction- Reduced ADL status  - Pain/difficulty with household work- Reduced ADL status   - Unable to perform sport/recreational activity due to pain and dysfunction         Updated Classification:   - Signs/symptoms consistent with post-surgical status including decreased ROM, strength and function. -- bony or soft tissue surgical procedure. (Pattern 4I)  - ligament or other connective tissue dysfunction.

## 2020-05-04 ENCOUNTER — OFFICE VISIT (OUTPATIENT)
Dept: ORTHOPEDIC SURGERY | Age: 20
End: 2020-05-04

## 2020-05-04 VITALS — HEIGHT: 77 IN | BODY MASS INDEX: 27.15 KG/M2 | WEIGHT: 229.94 LBS

## 2020-05-04 PROCEDURE — 99024 POSTOP FOLLOW-UP VISIT: CPT | Performed by: ORTHOPAEDIC SURGERY

## 2020-05-04 NOTE — PROGRESS NOTES
relatively good sensation in the deep peroneal area has no sensation in the supra  peroneal nerve area. His great toe has no dorsal sensation. He has no motor function in the common peroneal nerve. Meaning no dorsiflexion of his ankle and no dorsiflexion of his toes. Additional Examinations:  Left Lower Extremity: Examination of the left lower extremity does not show any tenderness, deformity or injury. Range of motion is within normal limits. There is no gross instability. There are no rashes, ulcerations or lesions. Strength and tone are normal.    At the time of his recent surgery he was found to have an obvious complete injury to his ACL and PCL on those 2 were debrided from the intercondylar space at that time. ASSESSMENT (Medical Decision Making)    Ailna Garsia is a 22 y.o. male with the following diagnosis: Status post a right knee severe dislocation which occurred 12/27/2020 in [place]. He has undergone revascularization on an emergent basis on 12/28/2020. He had a fasciotomy performed of the right lower extremity and he had placement of an external fixator which was just removed recently. He has returned to [place] and started outpatient physical therapy and has shown significant improvement although still limited range of motion of his knee and his ankle. His overall course is responding adequately to ongoing treatment      PLAN (Medical Decision Making)  Office Procedures:  No orders of the defined types were placed in this encounter. Treatment Plan:    I discussed the diagnosis and treatment options with Alina Garsia today. Think at this point we can plan to proceed with his ACL PCL reconstruction. This will be done both with allograft tissue for the ACL and the PCL. At the same time we would perform a Achilles tendon lengthening that would be done percutaneously and then place him in a short leg cast for that.     Also at the time of his knee surgery he

## 2020-05-07 ENCOUNTER — HOSPITAL ENCOUNTER (OUTPATIENT)
Dept: PHYSICAL THERAPY | Age: 20
Setting detail: THERAPIES SERIES
Discharge: HOME | End: 2020-05-07
Payer: COMMERCIAL

## 2020-05-07 PROCEDURE — 97140 MANUAL THERAPY 1/> REGIONS: CPT | Performed by: PHYSICAL THERAPIST

## 2020-05-07 PROCEDURE — 97112 NEUROMUSCULAR REEDUCATION: CPT | Performed by: PHYSICAL THERAPIST

## 2020-05-07 PROCEDURE — 97110 THERAPEUTIC EXERCISES: CPT | Performed by: PHYSICAL THERAPIST

## 2020-05-08 NOTE — FLOWSHEET NOTE
The [address] [place] [address] and Sports Rehabilitation      Physical Therapy Treatment Note/ Progress Report:           Date:    Patient Name:  Shanice Russo    :  2000  MRN: [id number]  Restrictions/Precautions:    Medical/Treatment Diagnosis Information:  · Diagnosis: R52 (ICD-10-CM) - Pain  · Treatment Diagnosis: right knee pain - m25.561  · S/P R Knee Dislocation; vascular reconstruction; external fixator removed - ACL/PCL/LCL deficient  Insurance/Certification information:  PT Insurance Information: anthem   Physician Information:  Referring Practitioner: dr Veronica Zarco   Has the plan of care been signed (Y/N):        []? Yes                    [x]?   No         Date of Patient follow up with Physician:       Is this a Progress Report:     []  Yes  [x]  No        If Yes:  Date Range for reporting period:  Beginning   Ending     Progress report will be due (10 Rx or 30 days whichever is less):       Recertification will be due (POC Duration  / 90 days whichever is less):         Visit # Insurance Allowable Auth Required   12 Pending  []  Yes []  No        Functional Scale:     Date assessed:        Latex Allergy:  [x]NO      []YES  Preferred Language for Healthcare:   [x]English       []other:      Pain level:  5/10     SUBJECTIVE: \"feeling good\"        OBJECTIVE:    Observation: 0-117 degrees    Test measurements:      RESTRICTIONS/PRECAUTIONS:     Exercises/Interventions:       Therapeutic Ex (71593) Sets/sec Reps Notes/CUES   HS stretch - towel  prop 5 x 30\"  Prop, 10# OP   Calf stretch - towel prop/strap 5 x 30\"  Prop, 10# OP   Quad sets   Adductor ball squeeze    Hip abd isometric 10\" 10x Hooklying   SLR supine SLR supine w/ NMES Standing SLR flexion 3 10 1#    Standing hip abd 3 10 2#   LAQ w/ NMES Alt - SAQ & HS isometric    eob knee flexion (ll/ld)  Knee ext ll/ld    HEPAnkle DF ll/ld  10' Incline board; lean into stretch   Wall slide - supine ERMI - ankle/knee flexionator 1' 12x 2 sets          Weight shifting 10\" 10x R Heel on 2\" step; L on 2\" step; 25% WB   Gait Training  5' Boot w/ heel wedge; B/L crutches   Cones   4x     Education       Mini squats   20x     Seated ll/ld   14'     Laq/saq     Prone hang     Leg press     reina - abd, ext         30x mr     8# - 8'     60# 30x    30x 45#       Manual Intervention (08928)      Prom/stm - ankle;   15'                                                                                                                                             Therapeutic Exercise and NMR EXR  [x] (79351) Provided verbal/tactile cueing for activities related to strengthening, flexibility, endurance, ROM for improvements in LE, proximal hip, and core control with self care, mobility, lifting, ambulation. [x] (63246) Provided verbal/tactile cueing for activities related to improving balance, coordination, kinesthetic sense, posture, motor skill, proprioception to assist with LE, proximal hip, and core control in self-care, mobility, lifting, ambulation and eccentric single leg control.      NMR and Therapeutic Activities:    [x] (53707 or 70850) Provided verbal/tactile cueing for activities related to improving balance, coordination, kinesthetic sense, posture, motor skill, proprioception and motor activation to allow for proper function of core, proximal hip and LE with self-care and ADLs and functional mobility.   [] (42406) Gait Re-education- Provided training and instruction to the patient for proper LE, core and proximal hip recruitment and positioning and eccentric body weight control with ambulation re-education including up and down stairs     Home Exercise Program:    [x] (43012) Reviewed/Progressed HEP activities related to strengthening, flexibility, endurance, ROM of core, proximal hip and LE for functional self-care, mobility, lifting and ambulation/stair navigation   [] (10572) Reviewed/Progressed HEP activities related to improving balance, Discharge      Electronically signed by:  Nicolette Mcmullen, PT, MPT   Note: If patient does not return for scheduled/ recommended follow up visits, this note will serve as a discharge from care along with most recent update on progress.

## 2020-05-13 ENCOUNTER — HOSPITAL ENCOUNTER (OUTPATIENT)
Dept: PHYSICAL THERAPY | Age: 20
Setting detail: THERAPIES SERIES
Discharge: HOME | End: 2020-05-13
Payer: COMMERCIAL

## 2020-05-13 PROCEDURE — 97140 MANUAL THERAPY 1/> REGIONS: CPT | Performed by: PHYSICAL THERAPIST

## 2020-05-13 PROCEDURE — 97112 NEUROMUSCULAR REEDUCATION: CPT | Performed by: PHYSICAL THERAPIST

## 2020-05-13 PROCEDURE — 97110 THERAPEUTIC EXERCISES: CPT | Performed by: PHYSICAL THERAPIST

## 2020-05-13 NOTE — FLOWSHEET NOTE
The [address] [place] [address] and Sports Rehabilitation      Physical Therapy Treatment Note/ Progress Report:           Date:  2020  Patient Name:  Margo Oscar    :  2000  MRN: [id number]  Restrictions/Precautions:    Medical/Treatment Diagnosis Information:  · Diagnosis: R52 (ICD-10-CM) - Pain  · Treatment Diagnosis: right knee pain - m25.561  · S/P R Knee Dislocation; vascular reconstruction; external fixator removed - ACL/PCL/LCL deficient  Insurance/Certification information:  PT Insurance Information: anthem   Physician Information:  Referring Practitioner: dr Cj Dorado   Has the plan of care been signed (Y/N):        []? Yes                    [x]?   No         Date of Patient follow up with Physician:       Is this a Progress Report:     []  Yes  [x]  No        If Yes:  Date Range for reporting period:  Beginning   Ending     Progress report will be due (10 Rx or 30 days whichever is less):       Recertification will be due (POC Duration  / 90 days whichever is less):         Visit # Insurance Allowable Auth Required   13 Pending  []  Yes []  No        Functional Scale:     Date assessed:        Latex Allergy:  [x]NO      []YES  Preferred Language for Healthcare:   [x]English       []other:      Pain level:  5/10     SUBJECTIVE: \"a little sore from standing all day on \"        OBJECTIVE:    Observation: 0-120 degrees    Test measurements:      RESTRICTIONS/PRECAUTIONS:     Exercises/Interventions:       Therapeutic Ex (57413) Sets/sec Reps Notes/CUES   HS stretch - towel  prop 5 x 30\"  Prop, 10# OP   Calf stretch - towel prop/strap 5 x 30\"  Prop, 10# OP   Quad sets   20 x 10\" Adductor ball squeeze    Hip abd isometric 10\" 10x Hooklying   SLR supine SLR supine w/ NMES 10\"/10\" 5'    Standing SLR flexion 3 10 1#    Standing hip abd 3 10 2#   LAQ w/ NMES 10\"/10\" 5' Alt - SAQ & HS isometric    eob knee flexion (ll/ld)  Knee ext ll/ld    HEPAnkle DF ll/ld  10' Incline board; lean ambulation/stair navigation   [] (90476) Reviewed/Progressed HEP activities related to improving balance, coordination, kinesthetic sense, posture, motor skill, proprioception of core, proximal hip and LE for self-care, mobility, lifting, and ambulation/stair navigation      Manual Treatments:  PROM / STM / Oscillations-Mobs:  G-I, II, III, IV (PA's, Inf., Post.)  [x] (35921) Provided manual therapy to mobilize LE, proximal hip and/or LS spine soft tissue/joints for the purpose of modulating pain, promoting relaxation, increasing ROM, reducing/eliminating soft tissue swelling/inflammation/restriction, improving soft tissue extensibility and allowing for proper ROM for normal function with self-care, mobility, lifting and ambulation. Modalities:  Ice 15' (end)   [] GAME READY (VASO)- for significant edema, swelling, pain control. Charges:  Timed Code Treatment Minutes: 76'    Total Treatment Minutes: 76'         []re-EVAL (LOW) 455 1011 (typically 20 minutes face-to-face)  [] EVAL (MOD) 08655 (typically 30 minutes face-to-face)  [] EVAL (HIGH) 51038 (typically 45 minutes face-to-face)  [] RE-EVAL     [x] IU(99916) x   3  [] IONTO  [x] NMR (48342) x 1    [] VASO  [x] Manual (33168) x 1     [] Other:  [] TA x      [] Mech Traction (80079)  [] ES(attended) (02570)      [] ES (un) (41636):         ASSESSMENT:  Progressing     GOALS:   Patient stated goal:    [] Progressing: [] Met: [] Not Met: [] Adjusted    Therapist goals for Patient:   Short Term Goals: To be achieved in: 2 weeks  1. Independent in HEP and progression per patient tolerance, in order to prevent re-injury. [] Progressing: [] Met: [] Not Met: [] Adjusted   2. Patient will have a decrease in pain to facilitate improvement in movement, function, and ADLs as indicated by Functional Deficits. [] Progressing: [] Met: [] Not Met: [] Adjusted    Long Term Goals:  To be achieved in:     6 weeks   1: I with hep  2: 0-125 degrees passive   3: 4/5 knee

## 2020-05-13 NOTE — PROGRESS NOTES
Obstructive Sleep Apnea (SINGH) Screening     Patient:  You Best    YOB: 2000      Medical Record #:  [id number]                     Date:  5/13/2020     1. Are you a loud and/or regular snorer? []  Yes       [x] No    2. Have you been observed to gasp or stop breathing during sleep? []  Yes       [x] No    3. Do you feel tired or groggy upon awakening or do you awaken with a headache?           []  Yes       [] No    4. Are you often tired or fatigued during the wake time hours? []  Yes       [] No    5. Do you fall asleep sitting, reading, watching TV or driving? []  Yes       [] No    6. Do you often have problems with memory or concentration? []  Yes       [] No    **If patient's score is ? 3 they are considered high risk for SINGH. An Anesthesia provider will evaluate the patient and develop a plan of care the day of surgery. Note:  If the patient's BMI is more than 35 kg m¯² , has neck circumference > 40 cm, and/or high blood pressure the risk is greater (© [employer], 2006).

## 2020-05-14 NOTE — PROGRESS NOTES
Patient is having a/an right knee with Dr. Cardenas on 5/19/20 and was seen at clinic for pre op covid test. . Patient instructed to self quarantine until the procedure.

## 2020-05-15 ENCOUNTER — TELEPHONE (OUTPATIENT)
Dept: ORTHOPEDIC SURGERY | Age: 20
End: 2020-05-15

## 2020-05-15 LAB
SARS-COV-2: NOT DETECTED
SOURCE: NORMAL

## 2020-05-18 ENCOUNTER — ANESTHESIA EVENT (OUTPATIENT)
Dept: OPERATING ROOM | Age: 20
End: 2020-05-18
Payer: COMMERCIAL

## 2020-05-19 ENCOUNTER — HOSPITAL ENCOUNTER (OUTPATIENT)
Age: 20
Setting detail: OUTPATIENT SURGERY
Discharge: HOME | End: 2020-05-19
Attending: ORTHOPAEDIC SURGERY | Admitting: ORTHOPAEDIC SURGERY
Payer: COMMERCIAL

## 2020-05-19 ENCOUNTER — ANESTHESIA (OUTPATIENT)
Dept: OPERATING ROOM | Age: 20
End: 2020-05-19
Payer: COMMERCIAL

## 2020-05-19 ENCOUNTER — APPOINTMENT (OUTPATIENT)
Dept: GENERAL RADIOLOGY | Age: 20
End: 2020-05-19
Attending: ORTHOPAEDIC SURGERY
Payer: COMMERCIAL

## 2020-05-19 VITALS
DIASTOLIC BLOOD PRESSURE: 42 MMHG | RESPIRATION RATE: 2 BRPM | OXYGEN SATURATION: 97 % | SYSTOLIC BLOOD PRESSURE: 123 MMHG

## 2020-05-19 VITALS
OXYGEN SATURATION: 99 % | HEART RATE: 93 BPM | HEIGHT: 77 IN | WEIGHT: 229 LBS | TEMPERATURE: 98.3 F | RESPIRATION RATE: 18 BRPM | DIASTOLIC BLOOD PRESSURE: 69 MMHG | BODY MASS INDEX: 27.04 KG/M2 | SYSTOLIC BLOOD PRESSURE: 138 MMHG

## 2020-05-19 PROBLEM — S83.511A RUPTURE OF ANTERIOR CRUCIATE LIGAMENT OF RIGHT KNEE: Status: ACTIVE | Noted: 2020-05-19

## 2020-05-19 PROBLEM — S83.521A: Status: ACTIVE | Noted: 2020-05-19

## 2020-05-19 PROBLEM — G57.31 PERONEAL NERVE PALSY, RIGHT: Status: ACTIVE | Noted: 2020-05-19

## 2020-05-19 PROCEDURE — 2580000003 HC RX 258: Performed by: ORTHOPAEDIC SURGERY

## 2020-05-19 PROCEDURE — 7100000010 HC PHASE II RECOVERY - FIRST 15 MIN: Performed by: ORTHOPAEDIC SURGERY

## 2020-05-19 PROCEDURE — C1776 JOINT DEVICE (IMPLANTABLE): HCPCS | Performed by: ORTHOPAEDIC SURGERY

## 2020-05-19 PROCEDURE — 2500000003 HC RX 250 WO HCPCS: Performed by: ANESTHESIOLOGY

## 2020-05-19 PROCEDURE — 6360000002 HC RX W HCPCS: Performed by: ANESTHESIOLOGY

## 2020-05-19 PROCEDURE — 64447 NJX AA&/STRD FEMORAL NRV IMG: CPT | Performed by: ANESTHESIOLOGY

## 2020-05-19 PROCEDURE — 2720000010 HC SURG SUPPLY STERILE: Performed by: ORTHOPAEDIC SURGERY

## 2020-05-19 PROCEDURE — 3700000000 HC ANESTHESIA ATTENDED CARE: Performed by: ORTHOPAEDIC SURGERY

## 2020-05-19 PROCEDURE — 7100000011 HC PHASE II RECOVERY - ADDTL 15 MIN: Performed by: ORTHOPAEDIC SURGERY

## 2020-05-19 PROCEDURE — 3600000014 HC SURGERY LEVEL 4 ADDTL 15MIN: Performed by: ORTHOPAEDIC SURGERY

## 2020-05-19 PROCEDURE — 3600000004 HC SURGERY LEVEL 4 BASE: Performed by: ORTHOPAEDIC SURGERY

## 2020-05-19 PROCEDURE — C1762 CONN TISS, HUMAN(INC FASCIA): HCPCS | Performed by: ORTHOPAEDIC SURGERY

## 2020-05-19 PROCEDURE — C1769 GUIDE WIRE: HCPCS | Performed by: ORTHOPAEDIC SURGERY

## 2020-05-19 PROCEDURE — 7100000001 HC PACU RECOVERY - ADDTL 15 MIN: Performed by: ORTHOPAEDIC SURGERY

## 2020-05-19 PROCEDURE — 3209999900 FLUORO FOR SURGICAL PROCEDURES

## 2020-05-19 PROCEDURE — 2500000003 HC RX 250 WO HCPCS: Performed by: NURSE ANESTHETIST, CERTIFIED REGISTERED

## 2020-05-19 PROCEDURE — 3700000001 HC ADD 15 MINUTES (ANESTHESIA): Performed by: ORTHOPAEDIC SURGERY

## 2020-05-19 PROCEDURE — 2580000003 HC RX 258: Performed by: NURSE ANESTHETIST, CERTIFIED REGISTERED

## 2020-05-19 PROCEDURE — 2580000003 HC RX 258: Performed by: ANESTHESIOLOGY

## 2020-05-19 PROCEDURE — 7100000000 HC PACU RECOVERY - FIRST 15 MIN: Performed by: ORTHOPAEDIC SURGERY

## 2020-05-19 PROCEDURE — C1713 ANCHOR/SCREW BN/BN,TIS/BN: HCPCS | Performed by: ORTHOPAEDIC SURGERY

## 2020-05-19 PROCEDURE — 73560 X-RAY EXAM OF KNEE 1 OR 2: CPT

## 2020-05-19 PROCEDURE — 6360000002 HC RX W HCPCS: Performed by: ORTHOPAEDIC SURGERY

## 2020-05-19 PROCEDURE — 6360000002 HC RX W HCPCS: Performed by: NURSE ANESTHETIST, CERTIFIED REGISTERED

## 2020-05-19 PROCEDURE — 2709999900 HC NON-CHARGEABLE SUPPLY: Performed by: ORTHOPAEDIC SURGERY

## 2020-05-19 DEVICE — ANCHOR SFT TISS OPN ADJ CORT 4 PNT KNOTLESS FIX SYS: Type: IMPLANTABLE DEVICE | Site: KNEE | Status: FUNCTIONAL

## 2020-05-19 DEVICE — GRAFT HUM TISS L60-80MM DIA7.5-10.5MM FRZN FLEXIGRFT: Type: IMPLANTABLE DEVICE | Site: KNEE | Status: FUNCTIONAL

## 2020-05-19 DEVICE — 8 X 20 MM SOFTSILK SCREW 1.5 STERILE
Type: IMPLANTABLE DEVICE | Site: KNEE | Status: FUNCTIONAL
Brand: SOFTSILK

## 2020-05-19 DEVICE — GRAFT HUM TISS W10-20MMXL19.5-20CM ACHILLES TEND FRZN: Type: IMPLANTABLE DEVICE | Site: KNEE | Status: FUNCTIONAL

## 2020-05-19 DEVICE — IMPLANTABLE DEVICE: Type: IMPLANTABLE DEVICE | Site: KNEE | Status: FUNCTIONAL

## 2020-05-19 DEVICE — SYSTEM FIX BNE TEND BNE W/ 10MM FLIPCUTTER II TIGHTROPE: Type: IMPLANTABLE DEVICE | Site: KNEE | Status: FUNCTIONAL

## 2020-05-19 DEVICE — BIOSURE REGENSORB INTERFERENCE                                    SCREW 7 MM X 20MM
Type: IMPLANTABLE DEVICE | Site: KNEE | Status: FUNCTIONAL
Brand: BIOSURE

## 2020-05-19 DEVICE — GRAFT HUM TISS W3MMXL20-25.9CM SEMITENDINOSUS TEND FRZN FOR: Type: IMPLANTABLE DEVICE | Site: KNEE | Status: FUNCTIONAL

## 2020-05-19 DEVICE — BUTTON SUT DIA11MM KNEE RND CONCV ABS 2 PC TIGHTROPE: Type: IMPLANTABLE DEVICE | Site: KNEE | Status: FUNCTIONAL

## 2020-05-19 RX ORDER — PROPOFOL 10 MG/ML
INJECTION, EMULSION INTRAVENOUS PRN
Status: DISCONTINUED | OUTPATIENT
Start: 2020-05-19 | End: 2020-05-19 | Stop reason: SDUPTHER

## 2020-05-19 RX ORDER — ACETAMINOPHEN 10 MG/ML
1000 INJECTION, SOLUTION INTRAVENOUS ONCE
Status: COMPLETED | OUTPATIENT
Start: 2020-05-19 | End: 2020-05-19

## 2020-05-19 RX ORDER — ONDANSETRON 2 MG/ML
INJECTION INTRAMUSCULAR; INTRAVENOUS PRN
Status: DISCONTINUED | OUTPATIENT
Start: 2020-05-19 | End: 2020-05-19 | Stop reason: SDUPTHER

## 2020-05-19 RX ORDER — SODIUM CHLORIDE, SODIUM LACTATE, POTASSIUM CHLORIDE, CALCIUM CHLORIDE 600; 310; 30; 20 MG/100ML; MG/100ML; MG/100ML; MG/100ML
INJECTION, SOLUTION INTRAVENOUS CONTINUOUS
Status: DISCONTINUED | OUTPATIENT
Start: 2020-05-19 | End: 2020-05-19 | Stop reason: HOSPADM

## 2020-05-19 RX ORDER — HYDRALAZINE HYDROCHLORIDE 20 MG/ML
5 INJECTION INTRAMUSCULAR; INTRAVENOUS EVERY 10 MIN PRN
Status: DISCONTINUED | OUTPATIENT
Start: 2020-05-19 | End: 2020-05-19 | Stop reason: HOSPADM

## 2020-05-19 RX ORDER — MEPERIDINE HYDROCHLORIDE 50 MG/ML
12.5 INJECTION INTRAMUSCULAR; INTRAVENOUS; SUBCUTANEOUS EVERY 5 MIN PRN
Status: DISCONTINUED | OUTPATIENT
Start: 2020-05-19 | End: 2020-05-19 | Stop reason: HOSPADM

## 2020-05-19 RX ORDER — MAGNESIUM HYDROXIDE 1200 MG/15ML
LIQUID ORAL CONTINUOUS PRN
Status: COMPLETED | OUTPATIENT
Start: 2020-05-19 | End: 2020-05-19

## 2020-05-19 RX ORDER — MIDAZOLAM HYDROCHLORIDE 1 MG/ML
INJECTION INTRAMUSCULAR; INTRAVENOUS
Status: COMPLETED
Start: 2020-05-19 | End: 2020-05-19

## 2020-05-19 RX ORDER — MORPHINE SULFATE 2 MG/ML
1 INJECTION, SOLUTION INTRAMUSCULAR; INTRAVENOUS EVERY 5 MIN PRN
Status: DISCONTINUED | OUTPATIENT
Start: 2020-05-19 | End: 2020-05-19 | Stop reason: HOSPADM

## 2020-05-19 RX ORDER — DIPHENHYDRAMINE HYDROCHLORIDE 50 MG/ML
12.5 INJECTION INTRAMUSCULAR; INTRAVENOUS
Status: DISCONTINUED | OUTPATIENT
Start: 2020-05-19 | End: 2020-05-19 | Stop reason: HOSPADM

## 2020-05-19 RX ORDER — MIDAZOLAM HYDROCHLORIDE 1 MG/ML
INJECTION INTRAMUSCULAR; INTRAVENOUS PRN
Status: DISCONTINUED | OUTPATIENT
Start: 2020-05-19 | End: 2020-05-19 | Stop reason: SDUPTHER

## 2020-05-19 RX ORDER — OXYCODONE HYDROCHLORIDE AND ACETAMINOPHEN 5; 325 MG/1; MG/1
1 TABLET ORAL EVERY 6 HOURS PRN
Qty: 28 TABLET | Refills: 0 | Status: SHIPPED | OUTPATIENT
Start: 2020-05-19 | End: 2020-05-22 | Stop reason: SDUPTHER

## 2020-05-19 RX ORDER — BUPIVACAINE HYDROCHLORIDE 5 MG/ML
INJECTION, SOLUTION EPIDURAL; INTRACAUDAL
Status: COMPLETED | OUTPATIENT
Start: 2020-05-19 | End: 2020-05-19

## 2020-05-19 RX ORDER — ROCURONIUM BROMIDE 10 MG/ML
INJECTION, SOLUTION INTRAVENOUS PRN
Status: DISCONTINUED | OUTPATIENT
Start: 2020-05-19 | End: 2020-05-19 | Stop reason: SDUPTHER

## 2020-05-19 RX ORDER — MORPHINE SULFATE 2 MG/ML
2 INJECTION, SOLUTION INTRAMUSCULAR; INTRAVENOUS EVERY 5 MIN PRN
Status: DISCONTINUED | OUTPATIENT
Start: 2020-05-19 | End: 2020-05-19 | Stop reason: HOSPADM

## 2020-05-19 RX ORDER — PROMETHAZINE HYDROCHLORIDE 25 MG/ML
6.25 INJECTION, SOLUTION INTRAMUSCULAR; INTRAVENOUS
Status: DISCONTINUED | OUTPATIENT
Start: 2020-05-19 | End: 2020-05-19 | Stop reason: HOSPADM

## 2020-05-19 RX ORDER — DEXAMETHASONE SODIUM PHOSPHATE 10 MG/ML
INJECTION INTRAMUSCULAR; INTRAVENOUS PRN
Status: DISCONTINUED | OUTPATIENT
Start: 2020-05-19 | End: 2020-05-19 | Stop reason: SDUPTHER

## 2020-05-19 RX ORDER — SODIUM CHLORIDE, SODIUM LACTATE, POTASSIUM CHLORIDE, CALCIUM CHLORIDE 600; 310; 30; 20 MG/100ML; MG/100ML; MG/100ML; MG/100ML
INJECTION, SOLUTION INTRAVENOUS CONTINUOUS PRN
Status: DISCONTINUED | OUTPATIENT
Start: 2020-05-19 | End: 2020-05-19 | Stop reason: SDUPTHER

## 2020-05-19 RX ORDER — OXYCODONE HYDROCHLORIDE AND ACETAMINOPHEN 5; 325 MG/1; MG/1
1 TABLET ORAL PRN
Status: DISCONTINUED | OUTPATIENT
Start: 2020-05-19 | End: 2020-05-19 | Stop reason: HOSPADM

## 2020-05-19 RX ORDER — FENTANYL CITRATE 50 UG/ML
INJECTION, SOLUTION INTRAMUSCULAR; INTRAVENOUS PRN
Status: DISCONTINUED | OUTPATIENT
Start: 2020-05-19 | End: 2020-05-19 | Stop reason: SDUPTHER

## 2020-05-19 RX ORDER — ONDANSETRON 2 MG/ML
4 INJECTION INTRAMUSCULAR; INTRAVENOUS PRN
Status: DISCONTINUED | OUTPATIENT
Start: 2020-05-19 | End: 2020-05-19 | Stop reason: HOSPADM

## 2020-05-19 RX ORDER — OXYCODONE HYDROCHLORIDE AND ACETAMINOPHEN 5; 325 MG/1; MG/1
2 TABLET ORAL PRN
Status: DISCONTINUED | OUTPATIENT
Start: 2020-05-19 | End: 2020-05-19 | Stop reason: HOSPADM

## 2020-05-19 RX ORDER — SODIUM CHLORIDE, SODIUM LACTATE, POTASSIUM CHLORIDE, AND CALCIUM CHLORIDE .6; .31; .03; .02 G/100ML; G/100ML; G/100ML; G/100ML
IRRIGANT IRRIGATION PRN
Status: DISCONTINUED | OUTPATIENT
Start: 2020-05-19 | End: 2020-05-19 | Stop reason: ALTCHOICE

## 2020-05-19 RX ORDER — LIDOCAINE HYDROCHLORIDE 20 MG/ML
INJECTION, SOLUTION INFILTRATION; PERINEURAL PRN
Status: DISCONTINUED | OUTPATIENT
Start: 2020-05-19 | End: 2020-05-19 | Stop reason: SDUPTHER

## 2020-05-19 RX ORDER — LABETALOL HYDROCHLORIDE 5 MG/ML
5 INJECTION, SOLUTION INTRAVENOUS EVERY 10 MIN PRN
Status: DISCONTINUED | OUTPATIENT
Start: 2020-05-19 | End: 2020-05-19 | Stop reason: HOSPADM

## 2020-05-19 RX ADMIN — HYDROMORPHONE HYDROCHLORIDE 0.25 MG: 1 INJECTION, SOLUTION INTRAMUSCULAR; INTRAVENOUS; SUBCUTANEOUS at 12:06

## 2020-05-19 RX ADMIN — PROPOFOL 100 MG: 10 INJECTION, EMULSION INTRAVENOUS at 10:51

## 2020-05-19 RX ADMIN — LIDOCAINE HYDROCHLORIDE 60 MG: 20 INJECTION, SOLUTION INFILTRATION; PERINEURAL at 07:19

## 2020-05-19 RX ADMIN — PROPOFOL 100 MG: 10 INJECTION, EMULSION INTRAVENOUS at 10:47

## 2020-05-19 RX ADMIN — ACETAMINOPHEN 1000 MG: 10 INJECTION, SOLUTION INTRAVENOUS at 07:02

## 2020-05-19 RX ADMIN — BUPIVACAINE HYDROCHLORIDE 30 ML: 5 INJECTION, SOLUTION EPIDURAL; INTRACAUDAL; PERINEURAL at 10:18

## 2020-05-19 RX ADMIN — ONDANSETRON 4 MG: 2 INJECTION INTRAMUSCULAR; INTRAVENOUS at 12:52

## 2020-05-19 RX ADMIN — MIDAZOLAM HYDROCHLORIDE 2 MG: 2 INJECTION, SOLUTION INTRAMUSCULAR; INTRAVENOUS at 06:50

## 2020-05-19 RX ADMIN — FENTANYL CITRATE 25 MCG: 50 INJECTION INTRAMUSCULAR; INTRAVENOUS at 10:36

## 2020-05-19 RX ADMIN — PROPOFOL 100 MG: 10 INJECTION, EMULSION INTRAVENOUS at 08:14

## 2020-05-19 RX ADMIN — FENTANYL CITRATE 25 MCG: 50 INJECTION INTRAMUSCULAR; INTRAVENOUS at 10:09

## 2020-05-19 RX ADMIN — DEXAMETHASONE SODIUM PHOSPHATE 10 MG: 10 INJECTION INTRAMUSCULAR; INTRAVENOUS at 07:19

## 2020-05-19 RX ADMIN — FENTANYL CITRATE 100 MCG: 50 INJECTION INTRAMUSCULAR; INTRAVENOUS at 08:14

## 2020-05-19 RX ADMIN — ONDANSETRON 4 MG: 2 INJECTION INTRAMUSCULAR; INTRAVENOUS at 07:14

## 2020-05-19 RX ADMIN — ROCURONIUM BROMIDE 50 MG: 10 SOLUTION INTRAVENOUS at 07:19

## 2020-05-19 RX ADMIN — PROPOFOL 300 MG: 10 INJECTION, EMULSION INTRAVENOUS at 07:19

## 2020-05-19 RX ADMIN — FENTANYL CITRATE 25 MCG: 50 INJECTION INTRAMUSCULAR; INTRAVENOUS at 10:17

## 2020-05-19 RX ADMIN — SUGAMMADEX 100 MG: 100 INJECTION, SOLUTION INTRAVENOUS at 10:55

## 2020-05-19 RX ADMIN — ONDANSETRON 4 MG: 2 INJECTION INTRAMUSCULAR; INTRAVENOUS at 10:29

## 2020-05-19 RX ADMIN — SODIUM CHLORIDE, POTASSIUM CHLORIDE, SODIUM LACTATE AND CALCIUM CHLORIDE: 600; 310; 30; 20 INJECTION, SOLUTION INTRAVENOUS at 07:14

## 2020-05-19 RX ADMIN — SODIUM CHLORIDE, POTASSIUM CHLORIDE, SODIUM LACTATE AND CALCIUM CHLORIDE: 600; 310; 30; 20 INJECTION, SOLUTION INTRAVENOUS at 06:41

## 2020-05-19 RX ADMIN — FENTANYL CITRATE 100 MCG: 50 INJECTION INTRAMUSCULAR; INTRAVENOUS at 07:14

## 2020-05-19 RX ADMIN — ROCURONIUM BROMIDE 30 MG: 10 SOLUTION INTRAVENOUS at 09:38

## 2020-05-19 RX ADMIN — FENTANYL CITRATE 50 MCG: 50 INJECTION INTRAMUSCULAR; INTRAVENOUS at 10:51

## 2020-05-19 RX ADMIN — CEFAZOLIN 1 G: 1 INJECTION, POWDER, FOR SOLUTION INTRAMUSCULAR; INTRAVENOUS at 10:14

## 2020-05-19 RX ADMIN — CEFAZOLIN 2 G: 1 INJECTION, POWDER, FOR SOLUTION INTRAMUSCULAR; INTRAVENOUS at 07:14

## 2020-05-19 RX ADMIN — PROPOFOL 100 MG: 10 INJECTION, EMULSION INTRAVENOUS at 10:41

## 2020-05-19 ASSESSMENT — PULMONARY FUNCTION TESTS
PIF_VALUE: 19
PIF_VALUE: 20
PIF_VALUE: 20
PIF_VALUE: 10
PIF_VALUE: 21
PIF_VALUE: 19
PIF_VALUE: 19
PIF_VALUE: 2
PIF_VALUE: 2
PIF_VALUE: 19
PIF_VALUE: 6
PIF_VALUE: 19
PIF_VALUE: 6
PIF_VALUE: 19
PIF_VALUE: 3
PIF_VALUE: 2
PIF_VALUE: 19
PIF_VALUE: 20
PIF_VALUE: 20
PIF_VALUE: 21
PIF_VALUE: 20
PIF_VALUE: 20
PIF_VALUE: 2
PIF_VALUE: 2
PIF_VALUE: 19
PIF_VALUE: 4
PIF_VALUE: 20
PIF_VALUE: 19
PIF_VALUE: 20
PIF_VALUE: 20
PIF_VALUE: 19
PIF_VALUE: 20
PIF_VALUE: 20
PIF_VALUE: 2
PIF_VALUE: 19
PIF_VALUE: 21
PIF_VALUE: 3
PIF_VALUE: 19
PIF_VALUE: 2
PIF_VALUE: 3
PIF_VALUE: 19
PIF_VALUE: 20
PIF_VALUE: 19
PIF_VALUE: 3
PIF_VALUE: 3
PIF_VALUE: 19
PIF_VALUE: 19
PIF_VALUE: 4
PIF_VALUE: 20
PIF_VALUE: 2
PIF_VALUE: 19
PIF_VALUE: 20
PIF_VALUE: 24
PIF_VALUE: 19
PIF_VALUE: 3
PIF_VALUE: 19
PIF_VALUE: 3
PIF_VALUE: 19
PIF_VALUE: 21
PIF_VALUE: 19
PIF_VALUE: 2
PIF_VALUE: 20
PIF_VALUE: 19
PIF_VALUE: 18
PIF_VALUE: 20
PIF_VALUE: 20
PIF_VALUE: 19
PIF_VALUE: 2
PIF_VALUE: 19
PIF_VALUE: 21
PIF_VALUE: 2
PIF_VALUE: 4
PIF_VALUE: 19
PIF_VALUE: 19
PIF_VALUE: 21
PIF_VALUE: 2
PIF_VALUE: 20
PIF_VALUE: 2
PIF_VALUE: 20
PIF_VALUE: 19
PIF_VALUE: 19
PIF_VALUE: 10
PIF_VALUE: 19
PIF_VALUE: 6
PIF_VALUE: 19
PIF_VALUE: 19
PIF_VALUE: 20
PIF_VALUE: 1
PIF_VALUE: 24
PIF_VALUE: 19
PIF_VALUE: 19
PIF_VALUE: 20
PIF_VALUE: 19
PIF_VALUE: 2
PIF_VALUE: 19
PIF_VALUE: 19
PIF_VALUE: 2
PIF_VALUE: 6
PIF_VALUE: 19
PIF_VALUE: 21
PIF_VALUE: 20
PIF_VALUE: 19
PIF_VALUE: 1
PIF_VALUE: 19
PIF_VALUE: 20
PIF_VALUE: 19
PIF_VALUE: 10
PIF_VALUE: 7
PIF_VALUE: 18
PIF_VALUE: 19
PIF_VALUE: 19
PIF_VALUE: 3
PIF_VALUE: 6
PIF_VALUE: 20
PIF_VALUE: 19
PIF_VALUE: 20
PIF_VALUE: 2
PIF_VALUE: 19
PIF_VALUE: 19
PIF_VALUE: 20
PIF_VALUE: 3
PIF_VALUE: 19
PIF_VALUE: 20
PIF_VALUE: 20
PIF_VALUE: 21
PIF_VALUE: 19
PIF_VALUE: 19
PIF_VALUE: 20
PIF_VALUE: 2
PIF_VALUE: 2
PIF_VALUE: 19
PIF_VALUE: 20
PIF_VALUE: 20
PIF_VALUE: 19
PIF_VALUE: 20
PIF_VALUE: 20
PIF_VALUE: 19
PIF_VALUE: 19
PIF_VALUE: 20
PIF_VALUE: 19
PIF_VALUE: 19
PIF_VALUE: 10
PIF_VALUE: 3
PIF_VALUE: 19
PIF_VALUE: 2
PIF_VALUE: 19
PIF_VALUE: 20
PIF_VALUE: 17
PIF_VALUE: 19
PIF_VALUE: 1
PIF_VALUE: 1
PIF_VALUE: 20
PIF_VALUE: 19
PIF_VALUE: 20
PIF_VALUE: 20
PIF_VALUE: 17
PIF_VALUE: 20
PIF_VALUE: 20
PIF_VALUE: 19
PIF_VALUE: 19
PIF_VALUE: 2
PIF_VALUE: 6
PIF_VALUE: 19
PIF_VALUE: 2
PIF_VALUE: 21
PIF_VALUE: 20
PIF_VALUE: 19
PIF_VALUE: 20
PIF_VALUE: 21
PIF_VALUE: 19
PIF_VALUE: 2
PIF_VALUE: 19
PIF_VALUE: 20
PIF_VALUE: 6
PIF_VALUE: 19
PIF_VALUE: 20
PIF_VALUE: 20
PIF_VALUE: 6
PIF_VALUE: 21
PIF_VALUE: 19
PIF_VALUE: 20
PIF_VALUE: 19
PIF_VALUE: 20
PIF_VALUE: 19
PIF_VALUE: 19
PIF_VALUE: 12
PIF_VALUE: 21
PIF_VALUE: 1
PIF_VALUE: 19
PIF_VALUE: 3
PIF_VALUE: 2

## 2020-05-19 ASSESSMENT — PAIN DESCRIPTION - LOCATION
LOCATION: ANKLE;KNEE
LOCATION: ANKLE;KNEE

## 2020-05-19 ASSESSMENT — PAIN SCALES - GENERAL
PAINLEVEL_OUTOF10: 6
PAINLEVEL_OUTOF10: 5
PAINLEVEL_OUTOF10: 4
PAINLEVEL_OUTOF10: 0
PAINLEVEL_OUTOF10: 0
PAINLEVEL_OUTOF10: 4
PAINLEVEL_OUTOF10: 4
PAINLEVEL_OUTOF10: 5

## 2020-05-19 ASSESSMENT — PAIN DESCRIPTION - ORIENTATION
ORIENTATION: RIGHT
ORIENTATION: RIGHT

## 2020-05-19 ASSESSMENT — PAIN DESCRIPTION - DESCRIPTORS
DESCRIPTORS: ACHING;BURNING
DESCRIPTORS: TINGLING
DESCRIPTORS: ACHING;BURNING

## 2020-05-19 ASSESSMENT — PAIN DESCRIPTION - PAIN TYPE
TYPE: SURGICAL PAIN
TYPE: SURGICAL PAIN

## 2020-05-19 ASSESSMENT — PAIN - FUNCTIONAL ASSESSMENT: PAIN_FUNCTIONAL_ASSESSMENT: 0-10

## 2020-05-19 NOTE — ANESTHESIA PRE PROCEDURE
Right 12/28/20    after injury to right knee    FRACTURE SURGERY Right     fractured right knee; dislocation and tendons torn    KNEE SURGERY Right 12/28/2019    hyperextended right knee; popliteal artery severed       Social History:    Social History     Tobacco Use    Smoking status: Former Smoker    Smokeless tobacco: Never Used    Tobacco comment: Social smoker only; has not smoked past 6 months   Substance Use Topics    Alcohol use: Not Currently                                Counseling given: Not Answered  Comment: Social smoker only; has not smoked past 6 months      Vital Signs (Current):   Vitals:    05/13/20 1602 05/19/20 0636   BP:  (!) 118/47   Pulse:  80   Resp:  18   Temp:  98.7 °F (37.1 °C)   TempSrc:  Temporal   SpO2:  97%   Weight: 229 lb (103.9 kg) 229 lb (103.9 kg)   Height: 6' 5\" (1.956 m) 6' 5\" (1.956 m)                                              BP Readings from Last 3 Encounters:   05/19/20 (!) 118/47   04/28/20 (!) 97/52   04/28/20 (!) 143/75       NPO Status: Time of last liquid consumption: 2230                        Time of last solid consumption: 1900                        Date of last liquid consumption: 05/18/20                        Date of last solid food consumption: 05/18/20    BMI:   Wt Readings from Last 3 Encounters:   05/19/20 229 lb (103.9 kg)   05/04/20 229 lb 15 oz (104.3 kg)   04/28/20 230 lb (104.3 kg)     Body mass index is 27.16 kg/m². CBC: No results found for: WBC, RBC, HGB, HCT, MCV, RDW, PLT    CMP: No results found for: NA, K, CL, CO2, BUN, CREATININE, GFRAA, AGRATIO, LABGLOM, GLUCOSE, PROT, CALCIUM, BILITOT, ALKPHOS, AST, ALT    POC Tests: No results for input(s): POCGLU, POCNA, POCK, POCCL, POCBUN, POCHEMO, POCHCT in the last 72 hours.     Coags: No results found for: PROTIME, INR, APTT    HCG (If Applicable): No results found for: PREGTESTUR, PREGSERUM, HCG, HCGQUANT     ABGs: No results found for: PHART, PO2ART, IOO7QOD, AFR6VHX, BEART, T7CSKKNW Type & Screen (If Applicable):  No results found for: LABABO, LABRH    Drug/Infectious Status (If Applicable):  No results found for: HIV, HEPCAB    COVID-19 Screening (If Applicable):   Lab Results   Component Value Date    COVID19 Not Detected 05/14/2020         Anesthesia Evaluation  Patient summary reviewed and Nursing notes reviewed  Airway: Mallampati: II  TM distance: >3 FB   Neck ROM: full  Mouth opening: > = 3 FB Dental: normal exam         Pulmonary:Negative Pulmonary ROS and normal exam  breath sounds clear to auscultation                             Cardiovascular:Negative CV ROS            Rhythm: regular  Rate: normal                    Neuro/Psych:   Negative Neuro/Psych ROS              GI/Hepatic/Renal: Neg GI/Hepatic/Renal ROS            Endo/Other: Negative Endo/Other ROS                    Abdominal:           Vascular: negative vascular ROS. Anesthesia Plan      general     ASA 1     (Block)  Induction: intravenous. MIPS: Postoperative opioids intended and Prophylactic antiemetics administered. Anesthetic plan and risks discussed with patient. Plan discussed with CRNA.                   Beth Balderrama MD   5/19/2020

## 2020-05-19 NOTE — PROGRESS NOTES
Patient arrive in recovery with oral airway in place. Removed at 1113  without difficulty. Color pale, placed on O2 right away and alcohol pad to nares.  Increased IVF some

## 2020-05-20 ENCOUNTER — OFFICE VISIT (OUTPATIENT)
Dept: ORTHOPEDIC SURGERY | Age: 20
End: 2020-05-20

## 2020-05-20 VITALS — HEIGHT: 77 IN | BODY MASS INDEX: 27.05 KG/M2 | WEIGHT: 229.06 LBS

## 2020-05-20 PROCEDURE — 99024 POSTOP FOLLOW-UP VISIT: CPT | Performed by: ORTHOPAEDIC SURGERY

## 2020-05-20 NOTE — PROGRESS NOTES
extremity. The ipsilateral lower extremity motor function appears intact some limitation due to pain     Lower leg remains in a short leg cast which is been bivalved. It is in good condition. His toes are exposed and they have good capillary refill. Warm to touch. Change the dressing on his knee. Did not remove his short leg cast today. His portal sites all look fine. They were covered with Band-Aids. ASSESSMENT (Medical Decision Making)    Margo Oscar is a 22 y.o. male progressing well from right knee arthroscopy with ACL/PCL reconstruction with exploration and neuro lysis of the peroneal nerve as well as heel cord lengthening and application of a leg cast.      ICD-10-CM    1. Sprain of anterior cruciate ligament of right knee, subsequent encounter S83.511D    2. Complete tear of right ACL, initial encounter S83.511A    3. Tear of PCL (posterior cruciate ligament) of knee, right, initial encounter S83.521A    4. Knee dislocation, right, sequela S83.104S    5. 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 Treatment Plan: Today I suggested that he basically stay at home stay off of his leg and I keep his leg elevated for the next 48 hours and we can see him again on Friday which would be 2 days from now for reevaluation at that time we will x-ray his knee with just an AP and lateral radiograph. Non-steroidal anti-inflammatories medications (NSAIDs) can be used to assist with pain control and to reduce post op inflammatory changes. These medications may be over-the-counter or prescribed. We discussed taking the NSAID properly and the precautions. The patient understands that this medication may potentially interfere with other medications. Patient was also instructed to immediately discontinue the medication is there is any possible complication.      Gudelia You

## 2020-05-20 NOTE — OP NOTE
at this procedure  which included reconstruction of his anterior cruciate ligament,  reconstruction of his posterior cruciate ligament, both of which were  unstable. We suggested an exploration of his peroneal nerve especially  over the fibular head, which is an area where it frequently becomes  extremely tight and constricted in such injuries. We also recommended  that he undergo a percutaneous Achilles tendon lengthening and if  successful, application of a short leg cast to hold his foot in a  neutral position if that was obtainable. We discussed with him the use  of x-ray at the time of surgery to help guide us with his PCL  reconstruction because of the significant amount of scar tissue which  had occurred in the posterior aspect of his knee secondary to his  complete disruption of his posterior capsule which was evidence on his  initial MRI scan. He did elect to proceed with these procedures. He has been very  cooperative throughout our evaluation and treatment of him. His family  also had been very cooperative. He understands that there is  significant risk involved with this surgery because of his prior  vascular injury. There are no guarantees that his peroneal nerve  exploration will result in any significant improvement and he realizes  that his foot equinus is a significant problem for him to be able to  ambulate in the future. OPERATIVE PROCEDURE:  He was seen in the holding area by myself. He did  have a lower extremity block by anesthesia. We reviewed once again the  plan for the day and he was taken to the operating room where general  anesthesia was induced. We again reexamined his knee for stability and  assured ourselves that his posterolateral corner appeared to be stable. He had a negative bowel test in the operating room under anesthesia. He  does have a posterior sag of his knee secondary to his PCL injury and  has significant anterior and posterior instability.   He did not we  evaluated him in the recovery room. He was not having any significant  distal pain, but I was concerned about his cast, therefore, I bivalved  his cast in the recovery room and then wrapped it with an Ace wrap. He  was discharged home. Of note is that I contacted him the evening of  surgery at home by telephone and he was doing well. ADDENDUM:  Radiograph interpretation was performed throughout the  posterior cruciate ligament procedure using the large C-arm in the  operating room for verification of pin placement and tunnel placement  for the posterior cruciate ligament tunnel of the tibia to protect the  posterior vascular and neurologic structures. That radiographic  interpretation was very accurate and very helpful during the procedure  itself.         Esthela Diggs MD    D: 05/20/2020 7:56:20       T: 05/20/2020 15:10:22     [email or web address]  Job#: [id number]     Doc#: [id number]    CC:

## 2020-05-22 ENCOUNTER — OFFICE VISIT (OUTPATIENT)
Dept: ORTHOPEDIC SURGERY | Age: 20
End: 2020-05-22

## 2020-05-22 VITALS — HEIGHT: 77 IN | BODY MASS INDEX: 27.05 KG/M2 | WEIGHT: 229.06 LBS

## 2020-05-22 PROCEDURE — 99024 POSTOP FOLLOW-UP VISIT: CPT | Performed by: ORTHOPAEDIC SURGERY

## 2020-05-22 RX ORDER — OXYCODONE HYDROCHLORIDE AND ACETAMINOPHEN 5; 325 MG/1; MG/1
1 TABLET ORAL EVERY 6 HOURS PRN
Qty: 28 TABLET | Refills: 0 | Status: SHIPPED | OUTPATIENT
Start: 2020-05-22 | End: 2020-05-29

## 2020-05-22 NOTE — PROGRESS NOTES
MD Nicole Esparza, [place]      Orthopaedic Surgery and Sports Medicine    Patient Name: Lashaun Hernandez  YOB: 2000  Patient's PCP is Jessy Morfin MD      SUBJECTIVE  Chief Complaint  Post-Op Check (knee and ankle)    Post op right knee arthroscopy with ACL and a double bundle PCL reconstruction with exploration of the peroneal nerve, as well as heel cord lengthening and application of a short leg cast.  Date of Surgery: 05/19/2020      History of Present Illness:  Lashaun Hernandez is a 22 y.o. male  Here for second follow up of right knee arthroscopy with ACL/PCL reconstruction with exploration of peroneal nerve and heel cord lengthening. Jairon Law He is doing significantly better today relative to 48 hours ago. Again, he has a short leg cast in place as well as a knee immobilizer. The patient is Non weight bearing and is ambulating with a walker. The patient's pain is rated at 4/10. The patient denies fever, wound drainage, increasing redness, pus, increasing swelling. Post op problems reported: none. Taking oral pain medication as needed. Pain Assessment: 4/10      OBJECTIVE  PHYSICAL EXAM  Vital Signs: There were no vitals filed for this visit. Body mass index is 27.16 kg/m². General Appearance: Patient is adequately groomed with no evidence of malnutrition   Orientation: Patient is alert and oriented to person, place and time. Mood and Affect: Neutral/Euthymic(normal)    Right Knee Examination  Dressing removed today. Portals and incision site are healing well. There is no warmth, erythema, or purulent drainage over the incision. Skin is intact. Ecchymosis is present. No wound or skin concerns. The sensation is intact to light touch in the ipsilateral lower extremity. The ipsilateral lower extremity motor function appears intact some limitation due to pain     Lower leg remains in a short leg cast which is been bivalved.   It is in good

## 2020-05-26 ENCOUNTER — HOSPITAL ENCOUNTER (OUTPATIENT)
Dept: PHYSICAL THERAPY | Age: 20
Setting detail: THERAPIES SERIES
Discharge: HOME | End: 2020-05-26
Payer: COMMERCIAL

## 2020-05-28 ENCOUNTER — HOSPITAL ENCOUNTER (OUTPATIENT)
Dept: PHYSICAL THERAPY | Age: 20
Setting detail: THERAPIES SERIES
Discharge: HOME | End: 2020-05-28
Payer: COMMERCIAL

## 2020-05-28 PROCEDURE — 97140 MANUAL THERAPY 1/> REGIONS: CPT | Performed by: PHYSICAL THERAPIST

## 2020-05-28 PROCEDURE — 97164 PT RE-EVAL EST PLAN CARE: CPT | Performed by: PHYSICAL THERAPIST

## 2020-05-28 PROCEDURE — 97110 THERAPEUTIC EXERCISES: CPT | Performed by: PHYSICAL THERAPIST

## 2020-05-28 PROCEDURE — G0283 ELEC STIM OTHER THAN WOUND: HCPCS | Performed by: PHYSICAL THERAPIST

## 2020-05-31 NOTE — FLOWSHEET NOTE
Therapeutic Exercise and NMR EXR  [x] (40480) Provided verbal/tactile cueing for activities related to strengthening, flexibility, endurance, ROM for improvements in LE, proximal hip, and core control with self care, mobility, lifting, ambulation. [x] (38790) Provided verbal/tactile cueing for activities related to improving balance, coordination, kinesthetic sense, posture, motor skill, proprioception to assist with LE, proximal hip, and core control in self-care, mobility, lifting, ambulation and eccentric single leg control.      NMR and Therapeutic Activities:    [x] (00408 or 66978) Provided verbal/tactile cueing for activities related to improving balance, coordination, kinesthetic sense, posture, motor skill, proprioception and motor activation to allow for proper function of core, proximal hip and LE with self-care and ADLs and functional mobility.   [] (84091) Gait Re-education- Provided training and instruction to the patient for proper LE, core and proximal hip recruitment and positioning and eccentric body weight control with ambulation re-education including up and down stairs     Home Exercise Program:    [x] (31514) Reviewed/Progressed HEP activities related to strengthening, flexibility, endurance, ROM of core, proximal hip and LE for functional self-care, mobility, lifting and ambulation/stair navigation   [] (24808) Reviewed/Progressed HEP activities related to improving balance, coordination, kinesthetic sense, posture, motor skill, proprioception of core, proximal hip and LE for self-care, mobility, lifting, and ambulation/stair navigation      Manual Treatments:  PROM / STM / Oscillations-Mobs:  G-I, II, III, IV (PA's, Inf., Post.)  [x] (98803) Provided manual therapy to mobilize LE, proximal hip and/or LS spine soft tissue/joints for the purpose of modulating pain, promoting relaxation, increasing ROM, reducing/eliminating soft tissue progression <30days   [] Goals require adjustment due to lack of progress  [] Patient is not progressing as expected and requires additional follow up with physician  [] Other    Prognosis for POC: [x] Good [] Fair  [] Poor      Patient requires continued skilled intervention: [x] Yes  [] No    Treatment/Activity Tolerance:  [x] Patient able to complete treatment  [] Patient limited by fatigue  [] Patient limited by pain    [] Patient limited by other medical complications  [x] Other:                 Return to Play: (if applicable)   []  Stage 1: Intro to Strength   []  Stage 2: Return to Run and Strength   []  Stage 3: Return to Jump and Strength   []  Stage 4: Dynamic Strength and Agility   []  Stage 5: Sport Specific Training     []  Ready to Return to Play, Meets All Above Stages   []  Not Ready for Return to Sports   Comments:                               PLAN:   [x] Continue per plan of care [] Alter current plan (see comments above)  [] Plan of care initiated [] Hold pending MD visit [] Discharge      Electronically signed by:  Alvarado Kerr PT, MPT   Note: If patient does not return for scheduled/ recommended follow up visits, this note will serve as a discharge from care along with most recent update on progress.

## 2020-05-31 NOTE — PROGRESS NOTES
The [address] [place] [address] and Sports Rehabilitation                                                    Physical Therapy Re-Certification Plan of Care    Dear Dr My Potter  ,    We had the pleasure of treating the following patient for physical therapy services at [address] [place] [address]. A summary of our findings can be found in the updated assessment below. This includes our plan of care. If you have any questions or concerns regarding these findings, please do not hesitate to contact me at the office phone number checked above. Thank you for the referral.       Physician Signature:_______________________________Date:__________________  By signing above (or electronic signature), therapists plan is approved by physician      Patient: Harvey Meek   : 2000   MRN: [id number]  Referring Physician:        Evaluation Date: 2020     Medical Diagnosis Information:  ·   Diagnosis: R52 (ICD-10-CM) - Pain  · Treatment Diagnosis: right knee pain - m25.561  S/P R Knee Dislocation; vascular reconstruction; external fixator removed - ACL/PCL reconstruction ()                                            Insurance information:  anthem      Date Range:3/28 -       Total visits:   16          SUBJECTIVE: \"I am feeling ok actually\"      Pain Scale: 7/10     OBJECTIVE:   Impairments:    ROM: 0-65 degrees passive (prone)       Strength/Neuromuscular control:  N/a       MEDICARE CAP EXCEPTION DOCUMENTATION  I certify that this patient meets one of the below criteria necessary for becoming an exception to the Medicare cap on therapy services:  []  The patient has a condition that has a direct and significant impact on the need for therapy. (Significantly impacts the rate of recovery)  []  The patient has a complexity that has a direct and significant impact on the need for therapy.   (Significantly impacts the rate of recovery and is associated with a primary condition.) []  The patient has associated variables that influence the amount of treatment to include:  Social support, self-efficacy/motivation, prognosis, time since onset/acuity. []  The patient has generalized musculoskeletal conditions or a condition affecting multiple sites that will have a direct impact on the rate of recovery. []  The patient had a prior episode of outpatient therapy during this calendar year for a different condition. []  The patient has a mental or cognitive disorder in addition to the condition being treated that will have a direct and significant impact on the rate of recovery. ASSESSMENT:    Response to Treatment:   - Patient is responding well to treatment and improvement is noted with regards to goals      Updated Functional deficiencies/Impairments: The patient demonstrated at least    72% but less than  74% impairment, limitation or restriction in:   - walking and moving around (mobility)     - Decreased LE ROM- Reduced overall functional mobility  - Decreased LE functional strength and neuromuscular control- Reduced overall functional mobility   - Pain/Difficulty with prolonged standing- Reduced overall functional mobility   - Pain/difficulty with transfers/bed mobility- Reduced overall functional mobility  - Reduced balance/proprioceptive control- Reduced overall functional level with mobility and gait  - Pain/difficulty with ambulation or prolonged ambulation- Reduced overall functional mobility  - Unable to perform self care tasks due to pain and dysfunction- Reduced ADL status  - Pain/difficulty with household work- Reduced ADL status   - Unable to perform sport/recreational activity due to pain and dysfunction         Updated Classification:   - Signs/symptoms consistent with post-surgical status including decreased ROM, strength and function. - bony or soft tissue surgical procedure. (Pattern 4I)  - ligament or other connective tissue dysfunction.  (Pattern 4D)  - localized inflammation. (Pattern 4E)  - fracture. (Pattern 4G)  - impaired muscle performance (Pattern 4C)  - Primary Prevention/Risk Reduction for loss of balance and falling. (Pattern 5A)     Prognosis/Rehab Potential:       - Good         Tolerance of evaluation/treatment:     - Good   -    New/Updated Goals (if applicable):  [] No change to goals established upon initial eval/last progress note:  New Goals:    GOALS:  Long Term Goals: To be achieved in: 5/28    12 weeks   1: I with hep  []? Progressing: []? Met: []? Not Met: []? Adjusted   2: 0-140 degrees passive   []? Progressing: []? Met: []? Not Met: []? Adjusted   3: 4/5 knee strength   []? Progressing: []? Met: []? Not Met: []? Adjusted   4. Mild gait deviations only   []? Progressing: []? Met: []? Not Met: []? Adjusted             Plan of Care:  [x] Continue Current Therapy Intervention    Frequency/Duration:1-2 days per week for 12 Weeks:  Interventions:  - Therapeutic exercise including: strength training, ROM/flexibility, NMR and proprioception for the proximal hip, trunk and Lower extremity  - Manual therapy as indicated including Dry Needling/IASTM, STM, PROM, Gr I-IV mobilizations, spinal mobilization/manipulation.   - Modalities as needed including: thermal agents, E-stim, US, iontophoresis as indicated. - Patient education on joint protection, activity modification, progression of HEP.         Electronically signed by:  Alana Cuellar, PT, MPT

## 2020-06-03 ENCOUNTER — OFFICE VISIT (OUTPATIENT)
Dept: ORTHOPEDIC SURGERY | Age: 20
End: 2020-06-03

## 2020-06-03 VITALS — WEIGHT: 229 LBS | HEIGHT: 77 IN | BODY MASS INDEX: 27.04 KG/M2

## 2020-06-03 PROCEDURE — 99024 POSTOP FOLLOW-UP VISIT: CPT | Performed by: ORTHOPAEDIC SURGERY

## 2020-06-03 NOTE — PROGRESS NOTES
some limitation due to pain     I also was able to evaluate his motion of it at his foot and ankle. He obviously still has the ability to actively plantar flex his foot. He cannot dorsiflex his ankle. I think he does have some very limited toe extension of the lesser toes. His sensation has improved throughout his foot and toes. He is not able to extend his great toe. Diagnostics:  No new x-rays taken today      ASSESSMENT (Medical Decision Making)    Lashaun Hernandez is a 22 y.o. male progressing well from right knee arthroscopy with ACL/PCL reconstruction with exploration and neuro lysis of the peroneal nerve as well as heel cord lengthening      ICD-10-CM    1. Rupture of anterior cruciate ligament of right knee, initial encounter S83.511A    2. Tear of posterior cruciate ligament of knee, right, initial encounter S83.521A    3. Peroneal nerve palsy, right G57.31            PLAN (Medical Decision Making)  Office Procedures:  No orders of the defined types were placed in this encounter. Treatment Plan:  Sutures were removed and Steri-Strips applied. Patient will remain in his Rebound PCL brace as well as his AFO. At this point, he can begin toe-touch weightbearing. We will continue outpatient physical therapy and can focus on knee extension as well as stretching his Achilles tendon. We will follow-up with us in approximately 2 weeks and we can reevaluate him again at that time    Non-steroidal anti-inflammatories medications (NSAIDs) can be used to assist with pain control and to reduce post op inflammatory changes. These medications may be over-the-counter or prescribed. We discussed taking the NSAID properly and the precautions. The patient understands that this medication may potentially interfere with other medications. Patient was also instructed to immediately discontinue the medication is there is any possible complication.      Lashaun Hernandez was instructed to call the office if his symptoms worsen or if new symptoms appear prior to the next scheduled visit. He is specifically instructed to contact the office between now and schedule appointment if he has concerns related to his condition or if he needs assistance in scheduling any above tests. He is welcome to call for an appointment sooner if he has any additional concerns or questions. Job Adan PA-C, scribing for and in the presence of Dr. José Evangelista   6/3/2020 11:55 AM  I, Dr. Armida Edge, personally performed the services described in this documentation as scribed by Brian Hughes. ADIA Puente in my presence, and it is both accurate and complete. Armida Edge MD        This dictation was performed with a verbal recognition program [facility]) and it was checked for errors. It is possible that there are still dictated errors within this office note. If so, please bring any errors to my attention for an addendum. All efforts were made to ensure that this office note is accurate.

## 2020-06-04 ENCOUNTER — HOSPITAL ENCOUNTER (OUTPATIENT)
Dept: PHYSICAL THERAPY | Age: 20
Setting detail: THERAPIES SERIES
Discharge: HOME | End: 2020-06-04
Payer: COMMERCIAL

## 2020-06-04 PROCEDURE — 97110 THERAPEUTIC EXERCISES: CPT | Performed by: PHYSICAL THERAPIST

## 2020-06-04 PROCEDURE — 97140 MANUAL THERAPY 1/> REGIONS: CPT | Performed by: PHYSICAL THERAPIST

## 2020-06-11 ENCOUNTER — HOSPITAL ENCOUNTER (OUTPATIENT)
Dept: PHYSICAL THERAPY | Age: 20
Setting detail: THERAPIES SERIES
Discharge: HOME | End: 2020-06-11
Payer: COMMERCIAL

## 2020-06-11 PROCEDURE — 97140 MANUAL THERAPY 1/> REGIONS: CPT | Performed by: PHYSICAL THERAPIST

## 2020-06-11 PROCEDURE — 97112 NEUROMUSCULAR REEDUCATION: CPT | Performed by: PHYSICAL THERAPIST

## 2020-06-11 PROCEDURE — 97110 THERAPEUTIC EXERCISES: CPT | Performed by: PHYSICAL THERAPIST

## 2020-06-14 NOTE — FLOWSHEET NOTE
Therapeutic Exercise and NMR EXR  [x] (84636) Provided verbal/tactile cueing for activities related to strengthening, flexibility, endurance, ROM for improvements in LE, proximal hip, and core control with self care, mobility, lifting, ambulation. [x] (80269) Provided verbal/tactile cueing for activities related to improving balance, coordination, kinesthetic sense, posture, motor skill, proprioception to assist with LE, proximal hip, and core control in self-care, mobility, lifting, ambulation and eccentric single leg control.      NMR and Therapeutic Activities:    [x] (53163 or 53607) Provided verbal/tactile cueing for activities related to improving balance, coordination, kinesthetic sense, posture, motor skill, proprioception and motor activation to allow for proper function of core, proximal hip and LE with self-care and ADLs and functional mobility.   [] (50155) Gait Re-education- Provided training and instruction to the patient for proper LE, core and proximal hip recruitment and positioning and eccentric body weight control with ambulation re-education including up and down stairs     Home Exercise Program:    [x] (29182) Reviewed/Progressed HEP activities related to strengthening, flexibility, endurance, ROM of core, proximal hip and LE for functional self-care, mobility, lifting and ambulation/stair navigation   [] (54827) Reviewed/Progressed HEP activities related to improving balance, coordination, kinesthetic sense, posture, motor skill, proprioception of core, proximal hip and LE for self-care, mobility, lifting, and ambulation/stair navigation      Manual Treatments:  PROM / STM / Oscillations-Mobs:  G-I, II, III, IV (PA's, Inf., Post.)  [x] (06915) Provided manual therapy to mobilize LE, proximal hip and/or LS spine soft tissue/joints for the purpose of modulating pain, promoting relaxation, co-morbidities. [x] Plan just implemented, too soon to assess goals progression <30days   [] Goals require adjustment due to lack of progress  [] Patient is not progressing as expected and requires additional follow up with physician  [] Other    Prognosis for POC: [x] Good [] Fair  [] Poor      Patient requires continued skilled intervention: [x] Yes  [] No    Treatment/Activity Tolerance:  [x] Patient able to complete treatment  [] Patient limited by fatigue  [] Patient limited by pain    [] Patient limited by other medical complications  [x] Other:                 Return to Play: (if applicable)   []  Stage 1: Intro to Strength   []  Stage 2: Return to Run and Strength   []  Stage 3: Return to Jump and Strength   []  Stage 4: Dynamic Strength and Agility   []  Stage 5: Sport Specific Training     []  Ready to Return to Play, Meets All Above Stages   []  Not Ready for Return to Sports   Comments:                               PLAN:   [x] Continue per plan of care [] Alter current plan (see comments above)  [] Plan of care initiated [] Hold pending MD visit [] Discharge      Electronically signed by:  Clarke Johnson, PT, MPT   Note: If patient does not return for scheduled/ recommended follow up visits, this note will serve as a discharge from care along with most recent update on progress.

## 2020-06-17 ENCOUNTER — OFFICE VISIT (OUTPATIENT)
Dept: ORTHOPEDIC SURGERY | Age: 20
End: 2020-06-17

## 2020-06-17 VITALS — WEIGHT: 229 LBS | BODY MASS INDEX: 27.04 KG/M2 | HEIGHT: 77 IN

## 2020-06-17 PROCEDURE — 99024 POSTOP FOLLOW-UP VISIT: CPT | Performed by: ORTHOPAEDIC SURGERY

## 2020-06-17 NOTE — PROGRESS NOTES
so, please bring any errors to my attention for an addendum. All efforts were made to ensure that this office note is accurate.

## 2020-06-18 ENCOUNTER — HOSPITAL ENCOUNTER (OUTPATIENT)
Dept: PHYSICAL THERAPY | Age: 20
Setting detail: THERAPIES SERIES
Discharge: HOME | End: 2020-06-18
Payer: COMMERCIAL

## 2020-06-18 PROCEDURE — 97110 THERAPEUTIC EXERCISES: CPT | Performed by: PHYSICAL THERAPIST

## 2020-06-18 PROCEDURE — 97140 MANUAL THERAPY 1/> REGIONS: CPT | Performed by: PHYSICAL THERAPIST

## 2020-06-18 PROCEDURE — 97112 NEUROMUSCULAR REEDUCATION: CPT | Performed by: PHYSICAL THERAPIST

## 2020-06-20 NOTE — FLOWSHEET NOTE
pain, promoting relaxation, increasing ROM, reducing/eliminating soft tissue swelling/inflammation/restriction, improving soft tissue extensibility and allowing for proper ROM for normal function with self-care, mobility, lifting and ambulation. Modalities:  Ice 15'; high volt    [] GAME READY (VASO)- for significant edema, swelling, pain control. Charges:  Timed Code Treatment Minutes: 58'    Total Treatment Minutes: 58'         []re-EVAL (LOW) 455 1011 (typically 20 minutes face-to-face)  [] EVAL (MOD) 56462 (typically 30 minutes face-to-face)  [] EVAL (HIGH) 34779 (typically 45 minutes face-to-face)  [] RE-EVAL     [x] KR(50760) x 2    [] IONTO  [x] NMR (13096)  X 1  [] VASO  [x] Manual (19984) x 1   [] Other:  [] TA x      [] Mech Traction (11449)  [] ES(attended) (03325)      [] ES (un) (55504):         ASSESSMENT:  Progressing rom     GOALS:   Patient stated goal:    [] Progressing: [] Met: [] Not Met: [] Adjusted    Therapist goals for Patient:   Short Term Goals: To be achieved in: 2 weeks  1. Independent in HEP and progression per patient tolerance, in order to prevent re-injury. [] Progressing: [] Met: [] Not Met: [] Adjusted   2. Patient will have a decrease in pain to facilitate improvement in movement, function, and ADLs as indicated by Functional Deficits. [] Progressing: [] Met: [] Not Met: [] Adjusted    Long Term Goals: To be achieved in: 5/28    12 weeks   1: I with hep  [x] Progressing: [] Met: [] Not Met: [] Adjusted   2: 0-140 degrees passive   [x] Progressing: [] Met: [] Not Met: [] Adjusted   3: 4/5 knee strength   [x] Progressing: [] Met: [] Not Met: [] Adjusted   4. Mild gait deviations only   [x] Progressing: [] Met: [] Not Met: [] Adjusted     Overall Progression Towards Functional goals/ Treatment Progress Update:  [] Patient is progressing as expected towards functional goals listed. [] Progression is slowed due to complexities/Impairments listed.   [] Progression has

## 2020-06-25 ENCOUNTER — HOSPITAL ENCOUNTER (OUTPATIENT)
Dept: PHYSICAL THERAPY | Age: 20
Setting detail: THERAPIES SERIES
Discharge: HOME | End: 2020-06-25
Payer: COMMERCIAL

## 2020-06-25 PROCEDURE — 97112 NEUROMUSCULAR REEDUCATION: CPT | Performed by: PHYSICAL THERAPIST

## 2020-06-25 PROCEDURE — 97110 THERAPEUTIC EXERCISES: CPT | Performed by: PHYSICAL THERAPIST

## 2020-06-25 PROCEDURE — 97140 MANUAL THERAPY 1/> REGIONS: CPT | Performed by: PHYSICAL THERAPIST

## 2020-06-28 NOTE — FLOWSHEET NOTE
[facility]      Physical Therapy Treatment Note/ Progress Report:           Date:  2020  Patient Name:  Andrea Dillon    :  2000  MRN: [id number]  Restrictions/Precautions:    Medical/Treatment Diagnosis Information:  ·  Diagnosis: R52 (ICD-10-CM) - Pain  · Treatment Diagnosis: right knee pain - m25.561  S/P R Knee Dislocation; vascular reconstruction; external fixator removed - ACL/PCL reconstruction ()        Insurance/Certification information:  PT Insurance Information: anthem   Physician Information:  Referring Practitioner: dr Mary Capone   Has the plan of care been signed (Y/N):        []? Yes                    [x]?   No         Date of Patient follow up with Physician:       Is this a Progress Report:     [x]  Yes  []  No        If Yes:  Date Range for reporting period:  Beginning   Ending     Progress report will be due (10 Rx or 30 days whichever is less):       Recertification will be due (POC Duration  / 90 days whichever is less):         Visit # Insurance Allowable Auth Required   20 3  More covered  []  Yes []  No        Functional Scale:     Date assessed:        Latex Allergy:  [x]NO      []YES  Preferred Language for Healthcare:   [x]English       []other:      Pain level:  nr/10     SUBJECTIVE:   \"doing a lot better ekaterina\"         OBJECTIVE:    Observation:  Approximately 100 degrees flexion    Test measurements:      RESTRICTIONS/PRECAUTIONS:     Exercises/Interventions:       Therapeutic Ex (32348) Sets/sec Reps Notes/CUES   HS stretch - towel  prop 5 x 30\"  Prop, 10# OP   Calf stretch - towel prop/strap 5 x 30\"  Prop, 10# OP   Prone Ext ll/ld stretch  10'  Ext iso @ 60 degrees (gentle)  10 x 10\"      Seated heel prop  8'      reina  60# - 25x hip abdQuad sets  20'                                                                                                Manual Intervention (91256)      Prom/stm   15'     Patellar purpose of modulating pain, promoting relaxation, increasing ROM, reducing/eliminating soft tissue swelling/inflammation/restriction, improving soft tissue extensibility and allowing for proper ROM for normal function with self-care, mobility, lifting and ambulation. Modalities:  Ice 15'; high volt    [] GAME READY (VASO)- for significant edema, swelling, pain control. Charges:  Timed Code Treatment Minutes: 58'    Total Treatment Minutes: 58'         []re-EVAL (LOW) 455 1011 (typically 20 minutes face-to-face)  [] EVAL (MOD) 19401 (typically 30 minutes face-to-face)  [] EVAL (HIGH) 05555 (typically 45 minutes face-to-face)  [] RE-EVAL     [x] MV(14865) x 2    [] IONTO  [x] NMR (70722)  X 1  [] VASO  [x] Manual (61650) x 1   [] Other:  [] TA x      [] Mech Traction (24558)  [] ES(attended) (13980)      [] ES (un) (57266):         ASSESSMENT:  Progressing rom     GOALS:   Patient stated goal:    [] Progressing: [] Met: [] Not Met: [] Adjusted    Therapist goals for Patient:   Short Term Goals: To be achieved in: 2 weeks  1. Independent in HEP and progression per patient tolerance, in order to prevent re-injury. [] Progressing: [] Met: [] Not Met: [] Adjusted   2. Patient will have a decrease in pain to facilitate improvement in movement, function, and ADLs as indicated by Functional Deficits. [] Progressing: [] Met: [] Not Met: [] Adjusted    Long Term Goals: To be achieved in: 5/28    12 weeks   1: I with hep  [x] Progressing: [] Met: [] Not Met: [] Adjusted   2: 0-140 degrees passive   [x] Progressing: [] Met: [] Not Met: [] Adjusted   3: 4/5 knee strength   [x] Progressing: [] Met: [] Not Met: [] Adjusted   4. Mild gait deviations only   [x] Progressing: [] Met: [] Not Met: [] Adjusted     Overall Progression Towards Functional goals/ Treatment Progress Update:  [] Patient is progressing as expected towards functional goals listed.     [] Progression is slowed due to complexities/Impairments

## 2020-07-01 ENCOUNTER — HOSPITAL ENCOUNTER (OUTPATIENT)
Dept: PHYSICAL THERAPY | Age: 20
Setting detail: THERAPIES SERIES
Discharge: HOME | End: 2020-07-01
Payer: COMMERCIAL

## 2020-07-01 PROCEDURE — 97140 MANUAL THERAPY 1/> REGIONS: CPT | Performed by: PHYSICAL THERAPIST

## 2020-07-01 PROCEDURE — 97110 THERAPEUTIC EXERCISES: CPT | Performed by: PHYSICAL THERAPIST

## 2020-07-01 PROCEDURE — 97112 NEUROMUSCULAR REEDUCATION: CPT | Performed by: PHYSICAL THERAPIST

## 2020-07-04 NOTE — FLOWSHEET NOTE
The [address] [place] [address] and Sports Rehabilitation      Physical Therapy Treatment Note/ Progress Report:           Date:  2020  Patient Name:  Kalpana Hdez    :  2000  MRN: [id number]  Restrictions/Precautions:    Medical/Treatment Diagnosis Information:  ·  Diagnosis: R52 (ICD-10-CM) - Pain  · Treatment Diagnosis: right knee pain - m25.561  S/P R Knee Dislocation; vascular reconstruction; external fixator removed - ACL/PCL reconstruction ()        Insurance/Certification information:  PT Insurance Information: anthem   Physician Information:  Referring Practitioner: dr Jacquie Ventura   Has the plan of care been signed (Y/N):        []? Yes                    [x]?   No         Date of Patient follow up with Physician:       Is this a Progress Report:     [x]  Yes  []  No        If Yes:  Date Range for reporting period:  Beginning   Ending     Progress report will be due (10 Rx or 30 days whichever is less):       Recertification will be due (POC Duration  / 90 days whichever is less):         Visit # Insurance Allowable Auth Required   21 2  More covered  []  Yes []  No        Functional Scale:     Date assessed:        Latex Allergy:  [x]NO      []YES  Preferred Language for Healthcare:   [x]English       []other:      Pain level:  nr/10     SUBJECTIVE:   \"I am coming along\"        OBJECTIVE:    Observation:  105 degrees    Test measurements:      RESTRICTIONS/PRECAUTIONS:     Exercises/Interventions:       Therapeutic Ex (23856) Sets/sec Reps Notes/CUES   HS stretch - towel  prop 5 x 30\"     Calf stretch - towel prop/strap 5 x 30\"     Prone Ext ll/ld stretch  10'  Ext iso @ 60 degrees (gentle)  10 x 10\"      Seated heel prop  8'      reina  60# - 25x hip abdQuad sets  20'       Mini squats  20x      Wall slides  6'      Er hip stretch  5'                                                                             Manual Intervention (83700)      Prom/stm   30'     Patellar mobilizations   2'                                                                                                                                       Therapeutic Exercise and NMR EXR  [x] (38153) Provided verbal/tactile cueing for activities related to strengthening, flexibility, endurance, ROM for improvements in LE, proximal hip, and core control with self care, mobility, lifting, ambulation. [x] (95105) Provided verbal/tactile cueing for activities related to improving balance, coordination, kinesthetic sense, posture, motor skill, proprioception to assist with LE, proximal hip, and core control in self-care, mobility, lifting, ambulation and eccentric single leg control.      NMR and Therapeutic Activities:    [x] (15577 or 98775) Provided verbal/tactile cueing for activities related to improving balance, coordination, kinesthetic sense, posture, motor skill, proprioception and motor activation to allow for proper function of core, proximal hip and LE with self-care and ADLs and functional mobility.   [] (03490) Gait Re-education- Provided training and instruction to the patient for proper LE, core and proximal hip recruitment and positioning and eccentric body weight control with ambulation re-education including up and down stairs     Home Exercise Program:    [x] (27870) Reviewed/Progressed HEP activities related to strengthening, flexibility, endurance, ROM of core, proximal hip and LE for functional self-care, mobility, lifting and ambulation/stair navigation   [] (56380) Reviewed/Progressed HEP activities related to improving balance, coordination, kinesthetic sense, posture, motor skill, proprioception of core, proximal hip and LE for self-care, mobility, lifting, and ambulation/stair navigation      Manual Treatments:  PROM / STM / Oscillations-Mobs:  G-I, II, III, IV (PA's, Inf., Post.)  [x] (85042) Provided manual therapy to mobilize LE, proximal hip and/or LS spine soft tissue/joints for the purpose of modulating pain, promoting relaxation, increasing ROM, reducing/eliminating soft tissue swelling/inflammation/restriction, improving soft tissue extensibility and allowing for proper ROM for normal function with self-care, mobility, lifting and ambulation. Modalities:  Ice 15'; high volt    [] GAME READY (VASO)- for significant edema, swelling, pain control. Charges:  Timed Code Treatment Minutes: 79'    Total Treatment Minutes: 79'         []re-EVAL (LOW) 455 1011 (typically 20 minutes face-to-face)  [] EVAL (MOD) 65399 (typically 30 minutes face-to-face)  [] EVAL (HIGH) 79994 (typically 45 minutes face-to-face)  [] RE-EVAL     [x] UE(63157) x 2    [] IONTO  [x] NMR (95574)  X 1  [] VASO  [x] Manual (75731) x 1   [] Other:  [] TA x      [] Mech Traction (11132)  [] ES(attended) (36644)      [] ES (un) (59771):         ASSESSMENT:  Progressing rom     GOALS:   Patient stated goal:    [] Progressing: [] Met: [] Not Met: [] Adjusted    Therapist goals for Patient:   Short Term Goals: To be achieved in: 2 weeks  1. Independent in HEP and progression per patient tolerance, in order to prevent re-injury. [] Progressing: [] Met: [] Not Met: [] Adjusted   2. Patient will have a decrease in pain to facilitate improvement in movement, function, and ADLs as indicated by Functional Deficits. [] Progressing: [] Met: [] Not Met: [] Adjusted    Long Term Goals: To be achieved in: 5/28    12 weeks   1: I with hep  [x] Progressing: [] Met: [] Not Met: [] Adjusted   2: 0-140 degrees passive   [x] Progressing: [] Met: [] Not Met: [] Adjusted   3: 4/5 knee strength   [x] Progressing: [] Met: [] Not Met: [] Adjusted   4. Mild gait deviations only   [x] Progressing: [] Met: [] Not Met: [] Adjusted     Overall Progression Towards Functional goals/ Treatment Progress Update:  [] Patient is progressing as expected towards functional goals listed.     [] Progression is slowed due to complexities/Impairments listed. [] Progression has been slowed due to co-morbidities. [x] Plan just implemented, too soon to assess goals progression <30days   [] Goals require adjustment due to lack of progress  [] Patient is not progressing as expected and requires additional follow up with physician  [] Other    Prognosis for POC: [x] Good [] Fair  [] Poor      Patient requires continued skilled intervention: [x] Yes  [] No    Treatment/Activity Tolerance:  [x] Patient able to complete treatment  [] Patient limited by fatigue  [] Patient limited by pain    [] Patient limited by other medical complications  [x] Other:                 Return to Play: (if applicable)   []  Stage 1: Intro to Strength   []  Stage 2: Return to Run and Strength   []  Stage 3: Return to Jump and Strength   []  Stage 4: Dynamic Strength and Agility   []  Stage 5: Sport Specific Training     []  Ready to Return to Play, Meets All Above Stages   []  Not Ready for Return to Sports   Comments:                               PLAN:   [x] Continue per plan of care [] Alter current plan (see comments above)  [] Plan of care initiated [] Hold pending MD visit [] Discharge      Electronically signed by:  Jenny Mercedes PT, MPT   Note: If patient does not return for scheduled/ recommended follow up visits, this note will serve as a discharge from care along with most recent update on progress.

## 2020-07-08 ENCOUNTER — HOSPITAL ENCOUNTER (OUTPATIENT)
Dept: PHYSICAL THERAPY | Age: 20
Setting detail: THERAPIES SERIES
Discharge: HOME | End: 2020-07-08
Payer: COMMERCIAL

## 2020-07-08 PROCEDURE — 97112 NEUROMUSCULAR REEDUCATION: CPT | Performed by: PHYSICAL THERAPIST

## 2020-07-08 PROCEDURE — 97140 MANUAL THERAPY 1/> REGIONS: CPT | Performed by: PHYSICAL THERAPIST

## 2020-07-08 PROCEDURE — 97110 THERAPEUTIC EXERCISES: CPT | Performed by: PHYSICAL THERAPIST

## 2020-07-10 NOTE — FLOWSHEET NOTE
The [address] [place] [address] and Sports Rehabilitation      Physical Therapy Treatment Note/ Progress Report:           Date:  2020  Patient Name:  Nivia Candelario    :  2000  MRN: [id number]  Restrictions/Precautions:    Medical/Treatment Diagnosis Information:  ·  Diagnosis: R52 (ICD-10-CM) - Pain  · Treatment Diagnosis: right knee pain - m25.561  S/P R Knee Dislocation; vascular reconstruction; external fixator removed - ACL/PCL reconstruction ()        Insurance/Certification information:  PT Insurance Information: anthem   Physician Information:  Referring Practitioner: dr Yifan Gonzalez   Has the plan of care been signed (Y/N):        []? Yes                    [x]?   No         Date of Patient follow up with Physician:       Is this a Progress Report:     [x]  Yes  []  No        If Yes:  Date Range for reporting period:  Beginning   Ending     Progress report will be due (10 Rx or 30 days whichever is less):       Recertification will be due (POC Duration  / 90 days whichever is less):         Visit # Insurance Allowable Auth Required   22 1  More covered  []  Yes []  No        Functional Scale:     Date assessed:        Latex Allergy:  [x]NO      []YES  Preferred Language for Healthcare:   [x]English       []other:      Pain level:  nr/10     SUBJECTIVE:   \"I am coming along\"        OBJECTIVE:    Observation:  105 degrees    Test measurements:      RESTRICTIONS/PRECAUTIONS:     Exercises/Interventions:       Therapeutic Ex (00079) Sets/sec Reps Notes/CUES   HS stretch - towel  prop 5 x 30\"     Calf stretch - towel prop/strap 5 x 30\"     Prone Ext ll/ld stretch  10'  Ext iso @ 60 degrees (gentle)  10 x 10\"      Seated heel prop  8'      reina  60# - 25x hip abdQuad sets  20'       Mini squats  20x      Wall slides  6'      Er hip stretch  5'                                                                             Manual Intervention (17271)      Prom/stm   30'     Patellar mobilizations   2'                                                                                                                                       Therapeutic Exercise and NMR EXR  [x] (30501) Provided verbal/tactile cueing for activities related to strengthening, flexibility, endurance, ROM for improvements in LE, proximal hip, and core control with self care, mobility, lifting, ambulation. [x] (60471) Provided verbal/tactile cueing for activities related to improving balance, coordination, kinesthetic sense, posture, motor skill, proprioception to assist with LE, proximal hip, and core control in self-care, mobility, lifting, ambulation and eccentric single leg control.      NMR and Therapeutic Activities:    [x] (59122 or 07549) Provided verbal/tactile cueing for activities related to improving balance, coordination, kinesthetic sense, posture, motor skill, proprioception and motor activation to allow for proper function of core, proximal hip and LE with self-care and ADLs and functional mobility.   [] (73773) Gait Re-education- Provided training and instruction to the patient for proper LE, core and proximal hip recruitment and positioning and eccentric body weight control with ambulation re-education including up and down stairs     Home Exercise Program:    [x] (17413) Reviewed/Progressed HEP activities related to strengthening, flexibility, endurance, ROM of core, proximal hip and LE for functional self-care, mobility, lifting and ambulation/stair navigation   [] (44152) Reviewed/Progressed HEP activities related to improving balance, coordination, kinesthetic sense, posture, motor skill, proprioception of core, proximal hip and LE for self-care, mobility, lifting, and ambulation/stair navigation      Manual Treatments:  PROM / STM / Oscillations-Mobs:  G-I, II, III, IV (PA's, Inf., Post.)  [x] (92400) Provided manual therapy to mobilize LE, proximal hip and/or LS spine soft tissue/joints for the purpose of modulating pain, promoting relaxation, increasing ROM, reducing/eliminating soft tissue swelling/inflammation/restriction, improving soft tissue extensibility and allowing for proper ROM for normal function with self-care, mobility, lifting and ambulation. Modalities:  Ice 15'; high volt    [] GAME READY (VASO)- for significant edema, swelling, pain control. Charges:  Timed Code Treatment Minutes: 79'    Total Treatment Minutes: 79'         []re-EVAL (LOW) 455 1011 (typically 20 minutes face-to-face)  [] EVAL (MOD) 96146 (typically 30 minutes face-to-face)  [] EVAL (HIGH) 18741 (typically 45 minutes face-to-face)  [] RE-EVAL     [x] OM(62422) x 2    [] IONTO  [x] NMR (97346)  X 1  [] VASO  [x] Manual (00583) x2   [] Other:  [] TA x      [] Mech Traction (00805)  [] ES(attended) (51054)      [] ES (un) (59097):         ASSESSMENT:  Progressing rom     GOALS:   Patient stated goal:    [] Progressing: [] Met: [] Not Met: [] Adjusted    Therapist goals for Patient:   Short Term Goals: To be achieved in: 2 weeks  1. Independent in HEP and progression per patient tolerance, in order to prevent re-injury. [] Progressing: [] Met: [] Not Met: [] Adjusted   2. Patient will have a decrease in pain to facilitate improvement in movement, function, and ADLs as indicated by Functional Deficits. [] Progressing: [] Met: [] Not Met: [] Adjusted    Long Term Goals: To be achieved in: 5/28    12 weeks   1: I with hep  [x] Progressing: [] Met: [] Not Met: [] Adjusted   2: 0-140 degrees passive   [x] Progressing: [] Met: [] Not Met: [] Adjusted   3: 4/5 knee strength   [x] Progressing: [] Met: [] Not Met: [] Adjusted   4. Mild gait deviations only   [x] Progressing: [] Met: [] Not Met: [] Adjusted     Overall Progression Towards Functional goals/ Treatment Progress Update:  [] Patient is progressing as expected towards functional goals listed.     [] Progression is slowed due to complexities/Impairments

## 2020-07-15 ENCOUNTER — HOSPITAL ENCOUNTER (OUTPATIENT)
Dept: PHYSICAL THERAPY | Age: 20
Setting detail: THERAPIES SERIES
Discharge: HOME | End: 2020-07-15
Payer: COMMERCIAL

## 2020-07-15 ENCOUNTER — OFFICE VISIT (OUTPATIENT)
Dept: ORTHOPEDIC SURGERY | Age: 20
End: 2020-07-15

## 2020-07-15 VITALS — WEIGHT: 229 LBS | BODY MASS INDEX: 27.04 KG/M2 | HEIGHT: 77 IN

## 2020-07-15 PROCEDURE — 99024 POSTOP FOLLOW-UP VISIT: CPT | Performed by: ORTHOPAEDIC SURGERY

## 2020-07-15 PROCEDURE — 97110 THERAPEUTIC EXERCISES: CPT | Performed by: PHYSICAL THERAPIST

## 2020-07-15 PROCEDURE — 97140 MANUAL THERAPY 1/> REGIONS: CPT | Performed by: PHYSICAL THERAPIST

## 2020-07-15 NOTE — LETTER
[place] [address] [facility] [address]  Phone: [phone]  Fax: Claudia Escobar MD        July 15, 2020     Patient: Maurice Vallejo   YOB: 2000   Date of Visit: 7/15/2020       To Whom It May Concern: It is my medical opinion that Felicity Florian requires a disability parking placard for the following reasons:  ORTHOPAEDIC CONDITION  Duration of need: 1 year    If you have any questions or concerns, please don't hesitate to call.     Sincerely,          MD Wild Cazares MD

## 2020-07-15 NOTE — PROGRESS NOTES
foot and ankle. His range of motion at his knee has continued to improve. He still lacks the ability to actively dorsiflex. Passively his ankle range of motion still remains limited. Diagnostics:  No new x-rays taken today      ASSESSMENT (Medical Decision Making)    Orlin Grande is a 22 y.o. male progressing well from right knee arthroscopy with ACL/PCL reconstruction with exploration and neurolysis of the peroneal nerve as well as heel cord lengthening      PLAN (Medical Decision Making)  Office Procedures:  No orders of the defined types were placed in this encounter. Treatment Plan: At this point, the patient is doing better overall. We encouraged him to continue with physical therapy and will recommend increasing that to twice a week. We would like for him to continue to progress his weightbearing and discontinue his crutches as tolerated. Patient will follow-up with us in approximately 3 weeks for reevaluation    Non-steroidal anti-inflammatories medications (NSAIDs) can be used to assist with pain control and to reduce post op inflammatory changes. These medications may be over-the-counter or prescribed. We discussed taking the NSAID properly and the precautions. The patient understands that this medication may potentially interfere with other medications. Patient was also instructed to immediately discontinue the medication is there is any possible complication. Orlin Grande was instructed to call the office if his symptoms worsen or if new symptoms appear prior to the next scheduled visit. He is specifically instructed to contact the office between now and schedule appointment if he has concerns related to his condition or if he needs assistance in scheduling any above tests. He is welcome to call for an appointment sooner if he has any additional concerns or questions.     Aryan Olmos PA-C, scribing for and in the presence of Dr. David Fontana   7/15/2020 1:47 PM    I, Dr. Opal Olmos, personally performed the services described in this documentation as scribed by Sharon Puente PA-C in my presence, and it is both accurate and complete. Opal Olmos MD          This dictation was performed with a verbal recognition program [facility]) and it was checked for errors. It is possible that there are still dictated errors within this office note. If so, please bring any errors to my attention for an addendum. All efforts were made to ensure that this office note is accurate.

## 2020-07-17 NOTE — FLOWSHEET NOTE
The [address] [place] [address] Sports [facility]      Physical Therapy Treatment Note/ Progress Report:           Date:  7/15/2020  Patient Name:  Chantell Rios    :  2000  MRN: [id number]  Restrictions/Precautions:    Medical/Treatment Diagnosis Information:  ·  Diagnosis: R52 (ICD-10-CM) - Pain  · Treatment Diagnosis: right knee pain - m25.561  S/P R Knee Dislocation; vascular reconstruction; external fixator removed - ACL/PCL reconstruction ()        Insurance/Certification information:  PT Insurance Information: anthem   Physician Information:  Referring Practitioner: dr Krishna Board   Has the plan of care been signed (Y/N):        []? Yes                    [x]?   No         Date of Patient follow up with Physician:       Is this a Progress Report:     [x]  Yes  []  No        If Yes:  Date Range for reporting period:  Beginning   Ending     Progress report will be due (10 Rx or 30 days whichever is less):       Recertification will be due (POC Duration  / 90 days whichever is less):         Visit # Insurance Allowable Auth Required   22 1  More covered  []  Yes []  No        Functional Scale:     Date assessed:        Latex Allergy:  [x]NO      []YES  Preferred Language for Healthcare:   [x]English       []other:      Pain level:  nr/10     SUBJECTIVE:   \"I am able to do more\"        OBJECTIVE:    Observation:  117  degrees    Test measurements:      RESTRICTIONS/PRECAUTIONS:     Exercises/Interventions:       Therapeutic Ex (22826) Sets/sec Reps Notes/CUES   Bike     HS stretch - towel  prop 7'     5 x 30\"     Calf stretch - towel prop/strap 5 x 30\"     Prone Ext ll/ld stretch  10'  Ext iso @ 60 degrees (gentle)  10 x 10\"      Seated heel prop  8' 5#      renia  60# - 25x hip abdStep ups  2\" - 20x (with manual assistance)     Mini squats  20x      Wall slides  6'      Er hip stretch  5'   Standing on foam  60\" x 2 Manual Intervention (64254)      Prom/stm   30'     Patellar mobilizations   2'                                                                                                                                       Therapeutic Exercise and NMR EXR  [x] (86412) Provided verbal/tactile cueing for activities related to strengthening, flexibility, endurance, ROM for improvements in LE, proximal hip, and core control with self care, mobility, lifting, ambulation. [x] (22788) Provided verbal/tactile cueing for activities related to improving balance, coordination, kinesthetic sense, posture, motor skill, proprioception to assist with LE, proximal hip, and core control in self-care, mobility, lifting, ambulation and eccentric single leg control.      NMR and Therapeutic Activities:    [x] (59241 or 51628) Provided verbal/tactile cueing for activities related to improving balance, coordination, kinesthetic sense, posture, motor skill, proprioception and motor activation to allow for proper function of core, proximal hip and LE with self-care and ADLs and functional mobility.   [] (48261) Gait Re-education- Provided training and instruction to the patient for proper LE, core and proximal hip recruitment and positioning and eccentric body weight control with ambulation re-education including up and down stairs     Home Exercise Program:    [x] (63403) Reviewed/Progressed HEP activities related to strengthening, flexibility, endurance, ROM of core, proximal hip and LE for functional self-care, mobility, lifting and ambulation/stair navigation   [] (40464) Reviewed/Progressed HEP activities related to improving balance, coordination, kinesthetic sense, posture, motor skill, proprioception of core, proximal hip and LE for self-care, mobility, lifting, and ambulation/stair navigation      Manual Treatments:  PROM / STM / Oscillations-Mobs:  G-I, II, III, IV (PA's, Inf., Post.)  [x] (80608) Provided manual therapy to mobilize Progression is slowed due to complexities/Impairments listed. [] Progression has been slowed due to co-morbidities. [x] Plan just implemented, too soon to assess goals progression <30days   [] Goals require adjustment due to lack of progress  [] Patient is not progressing as expected and requires additional follow up with physician  [] Other    Prognosis for POC: [x] Good [] Fair  [] Poor      Patient requires continued skilled intervention: [x] Yes  [] No    Treatment/Activity Tolerance:  [x] Patient able to complete treatment  [] Patient limited by fatigue  [] Patient limited by pain    [] Patient limited by other medical complications  [x] Other:                 Return to Play: (if applicable)   []  Stage 1: Intro to Strength   []  Stage 2: Return to Run and Strength   []  Stage 3: Return to Jump and Strength   []  Stage 4: Dynamic Strength and Agility   []  Stage 5: Sport Specific Training     []  Ready to Return to Play, Meets All Above Stages   []  Not Ready for Return to Sports   Comments:                               PLAN:   [x] Continue per plan of care [] Alter current plan (see comments above)  [] Plan of care initiated [] Hold pending MD visit [] Discharge      Electronically signed by:  Nancy Hercules PT, MPT   Note: If patient does not return for scheduled/ recommended follow up visits, this note will serve as a discharge from care along with most recent update on progress.

## 2020-07-27 ENCOUNTER — HOSPITAL ENCOUNTER (OUTPATIENT)
Dept: PHYSICAL THERAPY | Age: 20
Setting detail: THERAPIES SERIES
Discharge: HOME | End: 2020-07-27
Payer: COMMERCIAL

## 2020-07-27 PROCEDURE — 97110 THERAPEUTIC EXERCISES: CPT | Performed by: PHYSICAL THERAPIST

## 2020-07-27 PROCEDURE — 97140 MANUAL THERAPY 1/> REGIONS: CPT | Performed by: PHYSICAL THERAPIST

## 2020-07-28 NOTE — FLOWSHEET NOTE
The [address] [place] [address] and Sports Rehabilitation      Physical Therapy Treatment Note/ Progress Report:           Date:  2020  Patient Name:  Adán Dejesus    :  2000  MRN: [id number]  Restrictions/Precautions:    Medical/Treatment Diagnosis Information:  ·  Diagnosis: R52 (ICD-10-CM) - Pain  · Treatment Diagnosis: right knee pain - m25.561  S/P R Knee Dislocation; vascular reconstruction; external fixator removed - ACL/PCL reconstruction ()        Insurance/Certification information:  PT Insurance Information: anthem   Physician Information:  Referring Practitioner: dr Joan Box   Has the plan of care been signed (Y/N):        []? Yes                    [x]? No         Date of Patient follow up with Physician:       Is this a Progress Report:     [x]  Yes  []  No        If Yes:  Date Range for reporting period:  Beginning   Ending     Progress report will be due (10 Rx or 30 days whichever is less):       Recertification will be due (POC Duration  / 90 days whichever is less):         Visit # Insurance Allowable Auth Required    []  Yes []  No        Functional Scale:     Date assessed:        Latex Allergy:  [x]NO      []YES  Preferred Language for Healthcare:   [x]English       []other:      Pain level:  nr/10     SUBJECTIVE:   \"I am feeling really inspired by my improvements recently\".          OBJECTIVE:    Observation:  -3.5 degrees df     Test measurements:      RESTRICTIONS/PRECAUTIONS:     Exercises/Interventions:       Therapeutic Ex (42308) Sets/sec Reps Notes/CUES   Bike      7'          Calf stretch - towel prop/strap 5 x 30\"     Prone Ext ll/ld stretch  10'  Ext iso @ 60 degrees (gentle)  10 x 10\"      Seated heel prop  8' 12#      reina  60# - 25x hip abdStep ups  2\" - 20x (with manual assistance)     Mini squats  30x      Wall slides  6'      Er hip stretch  5'   Standing on foam  60\" x 2     Standing slantboard  4 x 20\"  flexionator  5' (gentle)  laq (90-30) 30x      Standing hamstring curls                                                             Manual Intervention (86152)      Prom/stm   30'     Patellar mobilizations   2'                                                                                                                                       Therapeutic Exercise and NMR EXR  [x] (97547) Provided verbal/tactile cueing for activities related to strengthening, flexibility, endurance, ROM for improvements in LE, proximal hip, and core control with self care, mobility, lifting, ambulation. [x] (56805) Provided verbal/tactile cueing for activities related to improving balance, coordination, kinesthetic sense, posture, motor skill, proprioception to assist with LE, proximal hip, and core control in self-care, mobility, lifting, ambulation and eccentric single leg control.      NMR and Therapeutic Activities:    [x] (57804 or 52342) Provided verbal/tactile cueing for activities related to improving balance, coordination, kinesthetic sense, posture, motor skill, proprioception and motor activation to allow for proper function of core, proximal hip and LE with self-care and ADLs and functional mobility.   [] (58751) Gait Re-education- Provided training and instruction to the patient for proper LE, core and proximal hip recruitment and positioning and eccentric body weight control with ambulation re-education including up and down stairs     Home Exercise Program:    [x] (86530) Reviewed/Progressed HEP activities related to strengthening, flexibility, endurance, ROM of core, proximal hip and LE for functional self-care, mobility, lifting and ambulation/stair navigation   [] (98873) Reviewed/Progressed HEP activities related to improving balance, coordination, kinesthetic sense, posture, motor skill, proprioception of core, proximal hip and LE for self-care, mobility, lifting, and ambulation/stair navigation      Manual Treatments:  PROM / STM / Oscillations-Mobs:  G-I, II, III, IV (PA's, Inf., Post.)  [x] (33780) Provided manual therapy to mobilize LE, proximal hip and/or LS spine soft tissue/joints for the purpose of modulating pain, promoting relaxation, increasing ROM, reducing/eliminating soft tissue swelling/inflammation/restriction, improving soft tissue extensibility and allowing for proper ROM for normal function with self-care, mobility, lifting and ambulation. Modalities:  Ice 15'; high volt    [] GAME READY (VASO)- for significant edema, swelling, pain control. Charges:  Timed Code Treatment Minutes: 27' (one on one)    Total Treatment Minutes: 79'         []re-EVAL (LOW) 455 1011 (typically 20 minutes face-to-face)  [] EVAL (MOD) 35798 (typically 30 minutes face-to-face)  [] EVAL (HIGH) 84860 (typically 45 minutes face-to-face)  [] RE-EVAL     [x] NJ(40788) x 1    [] IONTO  [] NMR (51692)   [] VASO  [x] Manual (23374) x 1   [] Other:  [] TA x      [] Mech Traction (08816)  [] ES(attended) (86157)      [] ES (un) (61065):         ASSESSMENT:  Progressing rom     GOALS:   Patient stated goal:    [] Progressing: [] Met: [] Not Met: [] Adjusted    Therapist goals for Patient:   Short Term Goals: To be achieved in: 2 weeks  1. Independent in HEP and progression per patient tolerance, in order to prevent re-injury. [] Progressing: [] Met: [] Not Met: [] Adjusted   2. Patient will have a decrease in pain to facilitate improvement in movement, function, and ADLs as indicated by Functional Deficits. [] Progressing: [] Met: [] Not Met: [] Adjusted    Long Term Goals: To be achieved in: 5/28    12 weeks   1: I with hep  [x] Progressing: [] Met: [] Not Met: [] Adjusted   2: 0-140 degrees passive   [x] Progressing: [] Met: [] Not Met: [] Adjusted   3: 4/5 knee strength   [x] Progressing: [] Met: [] Not Met: [] Adjusted   4.   Mild gait deviations only   [x] Progressing: [] Met: [] Not Met: [] Adjusted     Overall Progression Towards Functional goals/ Treatment Progress Update:  [] Patient is progressing as expected towards functional goals listed. [] Progression is slowed due to complexities/Impairments listed. [] Progression has been slowed due to co-morbidities. [x] Plan just implemented, too soon to assess goals progression <30days   [] Goals require adjustment due to lack of progress  [] Patient is not progressing as expected and requires additional follow up with physician  [] Other    Prognosis for POC: [x] Good [] Fair  [] Poor      Patient requires continued skilled intervention: [x] Yes  [] No    Treatment/Activity Tolerance:  [x] Patient able to complete treatment  [] Patient limited by fatigue  [] Patient limited by pain    [] Patient limited by other medical complications  [x] Other:                 Return to Play: (if applicable)   []  Stage 1: Intro to Strength   []  Stage 2: Return to Run and Strength   []  Stage 3: Return to Jump and Strength   []  Stage 4: Dynamic Strength and Agility   []  Stage 5: Sport Specific Training     []  Ready to Return to Play, Meets All Above Stages   []  Not Ready for Return to Sports   Comments:                               PLAN:   [x] Continue per plan of care [] Alter current plan (see comments above)  [] Plan of care initiated [] Hold pending MD visit [] Discharge      Electronically signed by:  Blayne Dickinson PT, MPT   Note: If patient does not return for scheduled/ recommended follow up visits, this note will serve as a discharge from care along with most recent update on progress.

## 2020-07-30 ENCOUNTER — HOSPITAL ENCOUNTER (OUTPATIENT)
Dept: PHYSICAL THERAPY | Age: 20
Setting detail: THERAPIES SERIES
Discharge: HOME | End: 2020-07-30
Payer: COMMERCIAL

## 2020-07-30 PROCEDURE — 97110 THERAPEUTIC EXERCISES: CPT | Performed by: PHYSICAL THERAPIST

## 2020-07-30 PROCEDURE — 97140 MANUAL THERAPY 1/> REGIONS: CPT | Performed by: PHYSICAL THERAPIST

## 2020-07-31 NOTE — FLOWSHEET NOTE
The [address] [place] [address] and Sports Rehabilitation      Physical Therapy Treatment Note/ Progress Report:           Date:  2020  Patient Name:  Taylor Harris    :  2000  MRN: [id number]  Restrictions/Precautions:    Medical/Treatment Diagnosis Information:  ·  Diagnosis: R52 (ICD-10-CM) - Pain  · Treatment Diagnosis: right knee pain - m25.561  S/P R Knee Dislocation; vascular reconstruction; external fixator removed - ACL/PCL reconstruction ()        Insurance/Certification information:  PT Insurance Information: anthem   Physician Information:  Referring Practitioner: dr Hedy Toribio   Has the plan of care been signed (Y/N):        []? Yes                    [x]? No         Date of Patient follow up with Physician:       Is this a Progress Report:     [x]  Yes  []  No        If Yes:  Date Range for reporting period:  Beginning   Ending     Progress report will be due (10 Rx or 30 days whichever is less):       Recertification will be due (POC Duration  / 90 days whichever is less):         Visit # Insurance Allowable Auth Required   24 22 []  Yes []  No        Functional Scale:     Date assessed:        Latex Allergy:  [x]NO      []YES  Preferred Language for Healthcare:   [x]English       []other:      Pain level:  nr/10     SUBJECTIVE:   \"really coming along. Sabas Moore \"         OBJECTIVE:    Observation:     Test measurements:      RESTRICTIONS/PRECAUTIONS:     Exercises/Interventions:       Therapeutic Ex (21899) Sets/sec Reps Notes/CUES   Bike      7'          Calf stretch - towel prop/strap 5 x 30\"     Prone Ext ll/ld stretch  10'  Ext iso @ 60 degrees (gentle)  10 x 10\"      Seated heel prop  8' 12#      reina  60# - 25x hip abdStep ups  2\" - 20x (with manual assistance)     Mini squats (foam)  30x      Wall slides  6'      Er hip stretch  5'   Standing on foam  60\" x 2     Standing slantboard  4 x 20\"  flexionator  5' (gentle)  laq (90-30)  45x      Standing hamstring curls  30x Manual Intervention (73526)      Prom/stm   30'     Patellar mobilizations   2'                                                                                                                                       Therapeutic Exercise and NMR EXR  [x] (18374) Provided verbal/tactile cueing for activities related to strengthening, flexibility, endurance, ROM for improvements in LE, proximal hip, and core control with self care, mobility, lifting, ambulation. [x] (22187) Provided verbal/tactile cueing for activities related to improving balance, coordination, kinesthetic sense, posture, motor skill, proprioception to assist with LE, proximal hip, and core control in self-care, mobility, lifting, ambulation and eccentric single leg control.      NMR and Therapeutic Activities:    [x] (83381 or 53540) Provided verbal/tactile cueing for activities related to improving balance, coordination, kinesthetic sense, posture, motor skill, proprioception and motor activation to allow for proper function of core, proximal hip and LE with self-care and ADLs and functional mobility.   [] (39179) Gait Re-education- Provided training and instruction to the patient for proper LE, core and proximal hip recruitment and positioning and eccentric body weight control with ambulation re-education including up and down stairs     Home Exercise Program:    [x] (50489) Reviewed/Progressed HEP activities related to strengthening, flexibility, endurance, ROM of core, proximal hip and LE for functional self-care, mobility, lifting and ambulation/stair navigation   [] (27030) Reviewed/Progressed HEP activities related to improving balance, coordination, kinesthetic sense, posture, motor skill, proprioception of core, proximal hip and LE for self-care, mobility, lifting, and ambulation/stair navigation      Manual Treatments:  PROM / STM / Oscillations-Mobs:  G-I, II, III, IV (PA's, Inf., Post.)  [x] (76924) Provided manual therapy to mobilize LE, proximal hip and/or LS spine soft tissue/joints for the purpose of modulating pain, promoting relaxation, increasing ROM, reducing/eliminating soft tissue swelling/inflammation/restriction, improving soft tissue extensibility and allowing for proper ROM for normal function with self-care, mobility, lifting and ambulation. Modalities:  Ice 15'; high volt    [] GAME READY (VASO)- for significant edema, swelling, pain control. Charges:  Timed Code Treatment Minutes: 30'   Total Treatment Minutes: 79'         []re-EVAL (LOW) 455 1011 (typically 20 minutes face-to-face)  [] EVAL (MOD) 17970 (typically 30 minutes face-to-face)  [] EVAL (HIGH) 14931 (typically 45 minutes face-to-face)  [] RE-EVAL     [x] NV(99444) x 1    [] IONTO  [] NMR (85159)   [] VASO  [x] Manual (08469) x 1   [] Other:  [] TA x      [] Mech Traction (99805)  [] ES(attended) (48533)      [] ES (un) (73119):         ASSESSMENT:  Progressing rom     GOALS:   Patient stated goal:    [] Progressing: [] Met: [] Not Met: [] Adjusted    Therapist goals for Patient:   Short Term Goals: To be achieved in: 2 weeks  1. Independent in HEP and progression per patient tolerance, in order to prevent re-injury. [] Progressing: [] Met: [] Not Met: [] Adjusted   2. Patient will have a decrease in pain to facilitate improvement in movement, function, and ADLs as indicated by Functional Deficits. [] Progressing: [] Met: [] Not Met: [] Adjusted    Long Term Goals: To be achieved in: 5/28    12 weeks   1: I with hep  [x] Progressing: [] Met: [] Not Met: [] Adjusted   2: 0-140 degrees passive   [x] Progressing: [] Met: [] Not Met: [] Adjusted   3: 4/5 knee strength   [x] Progressing: [] Met: [] Not Met: [] Adjusted   4.   Mild gait deviations only   [x] Progressing: [] Met: [] Not Met: [] Adjusted     Overall Progression Towards Functional goals/ Treatment Progress Update:  [] Patient is progressing as

## 2020-08-12 ENCOUNTER — HOSPITAL ENCOUNTER (OUTPATIENT)
Dept: PHYSICAL THERAPY | Age: 20
Setting detail: THERAPIES SERIES
Discharge: HOME | End: 2020-08-12
Payer: COMMERCIAL

## 2020-08-12 ENCOUNTER — OFFICE VISIT (OUTPATIENT)
Dept: ORTHOPEDIC SURGERY | Age: 20
End: 2020-08-12

## 2020-08-12 VITALS — HEIGHT: 77 IN | WEIGHT: 229 LBS | BODY MASS INDEX: 27.04 KG/M2

## 2020-08-12 PROCEDURE — 97110 THERAPEUTIC EXERCISES: CPT | Performed by: PHYSICAL THERAPIST

## 2020-08-12 PROCEDURE — 97140 MANUAL THERAPY 1/> REGIONS: CPT | Performed by: PHYSICAL THERAPIST

## 2020-08-12 PROCEDURE — 99024 POSTOP FOLLOW-UP VISIT: CPT | Performed by: ORTHOPAEDIC SURGERY

## 2020-08-12 NOTE — PROGRESS NOTES
leg foot and ankle. His range of motion at his knee has continued to improve. He still lacks the ability to actively dorsiflex. Passively his ankle range of motion has improved. Diagnostics:  No new x-rays taken today      ASSESSMENT (Medical Decision Making)    Sherron Hughes is a 22 y.o. male progressing well from right knee arthroscopy with ACL/PCL reconstruction with exploration and neurolysis of the peroneal nerve as well as heel cord lengthening      PLAN (Medical Decision Making)  Office Procedures:  No orders of the defined types were placed in this encounter. Treatment Plan: At this point, the patient is doing better overall. We encouraged him to continue with physical therapy and will recommend he continue that at twice a week. He reports he has met the maximum number of PT sessions from his insurance however we would still recommend more so we will write a letter discussing that recommendation. Today he can discontinue the use of one crutch and weight-bear as tolerated using 1 crutch. We asked him to continue to work on a heel toe gait as well as lifting his right knee when he is ambulating. His external rotation contracture at his hip has improved however he still needs to work on that. Patient will follow-up with us in approximately 4 weeks for reevaluation    Non-steroidal anti-inflammatories medications (NSAIDs) can be used to assist with pain control and to reduce post op inflammatory changes. These medications may be over-the-counter or prescribed. We discussed taking the NSAID properly and the precautions. The patient understands that this medication may potentially interfere with other medications. Patient was also instructed to immediately discontinue the medication is there is any possible complication. Sherron Hughes was instructed to call the office if his symptoms worsen or if new symptoms appear prior to the next scheduled visit.  He is specifically instructed to contact the office between now and schedule appointment if he has concerns related to his condition or if he needs assistance in scheduling any above tests. He is welcome to call for an appointment sooner if he has any additional concerns or questions. Brett Bee PA-C, scribing for and in the presence of Dr. Vickie Vance   8/12/2020 4:17 PM  I, Dr. Lamine Mcmanus, personally performed the services described in this documentation as scribed by Juan A Dee. ADIA Puente in my presence, and it is both accurate and complete. Lamine Mcmanus MD          This dictation was performed with a verbal recognition program [facility]) and it was checked for errors. It is possible that there are still dictated errors within this office note. If so, please bring any errors to my attention for an addendum. All efforts were made to ensure that this office note is accurate.

## 2020-08-12 NOTE — FLOWSHEET NOTE
The [address] [place] [address] and Sports Rehabilitation      Physical Therapy Treatment Note/ Progress Report:           Date:  2020  Patient Name:  Caitlin Burris    :  2000  MRN: [id number]  Restrictions/Precautions:    Medical/Treatment Diagnosis Information:  ·  Diagnosis: R52 (ICD-10-CM) - Pain  · Treatment Diagnosis: right knee pain - m25.561  S/P R Knee Dislocation; vascular reconstruction; external fixator removed - ACL/PCL reconstruction ()        Insurance/Certification information:  PT Insurance Information: anthem   Physician Information:  Referring Practitioner: dr Roman Johnson   Has the plan of care been signed (Y/N):        []? Yes                    [x]? No         Date of Patient follow up with Physician:       Is this a Progress Report:     [x]  Yes  []  No        If Yes:  Date Range for reporting period:  Beginning   Ending     Progress report will be due (10 Rx or 30 days whichever is less):       Recertification will be due (POC Duration  / 90 days whichever is less):         Visit # Insurance Allowable Auth Required    []  Yes []  No        Functional Scale:     Date assessed:        Latex Allergy:  [x]NO      []YES  Preferred Language for Healthcare:   [x]English       []other:      Pain level:  nr/10     SUBJECTIVE:   Pt stated leg is doing pretty well. Some soreness/ cramping but nothing that was unexpected with increased activity.         OBJECTIVE:    Observation:  127 deg R knee flexion on flexionator   Test measurements:      RESTRICTIONS/PRECAUTIONS:     Exercises/Interventions:       Therapeutic Ex (75782) Sets/sec Reps Notes/CUES   Bike      6'          Calf stretch - towel prop/strap 5 x 30\"     Prone Ext ll/ld stretch  10'          Leg Press 30x 100#reina (abd, ext) 30x 60# -    Mini squats (foam)  30x      Wall slides  6'      Er hip stretch  5'       Standing slantboard  4 x 30\"  flexionator  5' (gentle)  laq (90-30)  45x      Standing hamstring (PA's, Inf., Post.)  [x] (45864) Provided manual therapy to mobilize LE, proximal hip and/or LS spine soft tissue/joints for the purpose of modulating pain, promoting relaxation, increasing ROM, reducing/eliminating soft tissue swelling/inflammation/restriction, improving soft tissue extensibility and allowing for proper ROM for normal function with self-care, mobility, lifting and ambulation. Modalities:  Ice 15';    [] GAME READY (VASO)- for significant edema, swelling, pain control. Charges:  Timed Code Treatment Minutes: 25' (one on one)   Total Treatment Minutes: 22'         []rEVAL (LOW) 455 1011 (typically 20 minutes face-to-face)  [] EVAL (MOD) 43470 (typically 30 minutes face-to-face)  [] EVAL (HIGH) 93432 (typically 45 minutes face-to-face)  [] RE-EVAL     [x] KN(45088) x 1    [] IONTO  [] NMR (56409)   [] VASO  [x] Manual (09191) x   1 [] Other:  [] TA x      [] Mech Traction (18538)  [] ES(attended) (18314)      [] ES (un) (90980):         ASSESSMENT:  Pt R knee and R ankle ROM continue to improve. Demonstrated improved LE closed chain strengthening performance. GOALS:   Patient stated goal:    [] Progressing: [] Met: [] Not Met: [] Adjusted    Therapist goals for Patient:   Short Term Goals: To be achieved in: 2 weeks  1. Independent in HEP and progression per patient tolerance, in order to prevent re-injury. [] Progressing: [] Met: [] Not Met: [] Adjusted   2. Patient will have a decrease in pain to facilitate improvement in movement, function, and ADLs as indicated by Functional Deficits. [] Progressing: [] Met: [] Not Met: [] Adjusted    Long Term Goals: To be achieved in: 5/28    12 weeks   1: I with hep  [x] Progressing: [] Met: [] Not Met: [] Adjusted   2: 0-140 degrees passive   [x] Progressing: [] Met: [] Not Met: [] Adjusted   3: 4/5 knee strength   [x] Progressing: [] Met: [] Not Met: [] Adjusted   4.   Mild gait deviations only   [x] Progressing: [] Met: [] Not Met: [] Adjusted     Overall Progression Towards Functional goals/ Treatment Progress Update:  [] Patient is progressing as expected towards functional goals listed. [] Progression is slowed due to complexities/Impairments listed. [] Progression has been slowed due to co-morbidities. [x] Plan just implemented, too soon to assess goals progression <30days   [] Goals require adjustment due to lack of progress  [] Patient is not progressing as expected and requires additional follow up with physician  [] Other    Prognosis for POC: [x] Good [] Fair  [] Poor      Patient requires continued skilled intervention: [x] Yes  [] No    Treatment/Activity Tolerance:  [x] Patient able to complete treatment  [] Patient limited by fatigue  [] Patient limited by pain    [] Patient limited by other medical complications  [x] Other:                 Return to Play: (if applicable)   []  Stage 1: Intro to Strength   []  Stage 2: Return to Run and Strength   []  Stage 3: Return to Jump and Strength   []  Stage 4: Dynamic Strength and Agility   []  Stage 5: Sport Specific Training     []  Ready to Return to Play, Meets All Above Stages   []  Not Ready for Return to Sports   Comments:                               PLAN:   [x] Continue per plan of care [] Alter current plan (see comments above)  [] Plan of care initiated [] Hold pending MD visit [] Discharge    Therapist was present, directed the patient's care, made skilled judgement, and was responsible for assessment and treatment of the patient. Electronically signed by: Graciela Parham, SPT,  Harpal Jara, PT, MPT   Note: If patient does not return for scheduled/ recommended follow up visits, this note will serve as a discharge from care along with most recent update on progress.

## 2020-08-14 ENCOUNTER — HOSPITAL ENCOUNTER (OUTPATIENT)
Dept: PHYSICAL THERAPY | Age: 20
Setting detail: THERAPIES SERIES
Discharge: HOME | End: 2020-08-14
Payer: COMMERCIAL

## 2020-08-14 PROCEDURE — 97530 THERAPEUTIC ACTIVITIES: CPT | Performed by: PHYSICAL THERAPIST

## 2020-08-14 PROCEDURE — 97110 THERAPEUTIC EXERCISES: CPT | Performed by: PHYSICAL THERAPIST

## 2020-08-14 PROCEDURE — 97140 MANUAL THERAPY 1/> REGIONS: CPT | Performed by: PHYSICAL THERAPIST

## 2020-08-16 NOTE — FLOWSHEET NOTE
The [address] [place] [address] and Sports Rehabilitation      Physical Therapy Treatment Note/ Progress Report:           Date:  2020  Patient Name:  Abram Garcia    :  2000  MRN: [id number]  Restrictions/Precautions:    Medical/Treatment Diagnosis Information:  ·  Diagnosis: R52 (ICD-10-CM) - Pain  · Treatment Diagnosis: right knee pain - m25.561  S/P R Knee Dislocation; vascular reconstruction; external fixator removed - ACL/PCL reconstruction ()        Insurance/Certification information:  PT Insurance Information: anthem   Physician Information:  Referring Practitioner: dr Yoko Jones   Has the plan of care been signed (Y/N):        []? Yes                    [x]? No         Date of Patient follow up with Physician:       Is this a Progress Report:     [x]  Yes  []  No        If Yes:  Date Range for reporting period:  Beginning   Ending     Progress report will be due (10 Rx or 30 days whichever is less):       Recertification will be due (POC Duration  / 90 days whichever is less):         Visit # Insurance Allowable Auth Required    []  Yes []  No        Functional Scale:     Date assessed:        Latex Allergy:  [x]NO      []YES  Preferred Language for Healthcare:   [x]English       []other:      Pain level:  nr/10     SUBJECTIVE:     \"doing well overall. Tommas Baptise \"         OBJECTIVE:   132 flexion    Test measurements:      RESTRICTIONS/PRECAUTIONS:     Exercises/Interventions:       Therapeutic Ex (11750) Sets/sec Reps Notes/CUES   Bike      6'          Calf stretch - towel prop/strap 5 x 30\"     Prone Ext ll/ld stretch  10'          Leg Press 30x 100#reina (abd, ext) 30x 60# -Step ups  4\" - 20x (with manual assistance)     Mini squats (foam)  30x      Wall slides  6'      Er hip stretch  5'   Standing on foam  60\" x 2     Standing slantboard  4 x 30\"  flexionator  5' (gentle)  laq (90-30)  45x mr     Standing hamstring curls  30x Manual Intervention (86666)      Prom/stm   22'     Patellar mobilizations   2'                                                                                                                                       Therapeutic Exercise and NMR EXR  [x] (16435) Provided verbal/tactile cueing for activities related to strengthening, flexibility, endurance, ROM for improvements in LE, proximal hip, and core control with self care, mobility, lifting, ambulation. [x] (54102) Provided verbal/tactile cueing for activities related to improving balance, coordination, kinesthetic sense, posture, motor skill, proprioception to assist with LE, proximal hip, and core control in self-care, mobility, lifting, ambulation and eccentric single leg control.      NMR and Therapeutic Activities:    [x] (68170 or 29573) Provided verbal/tactile cueing for activities related to improving balance, coordination, kinesthetic sense, posture, motor skill, proprioception and motor activation to allow for proper function of core, proximal hip and LE with self-care and ADLs and functional mobility.   [] (25116) Gait Re-education- Provided training and instruction to the patient for proper LE, core and proximal hip recruitment and positioning and eccentric body weight control with ambulation re-education including up and down stairs     Home Exercise Program:    [x] (53270) Reviewed/Progressed HEP activities related to strengthening, flexibility, endurance, ROM of core, proximal hip and LE for functional self-care, mobility, lifting and ambulation/stair navigation   [] (33961) Reviewed/Progressed HEP activities related to improving balance, coordination, kinesthetic sense, posture, motor skill, proprioception of core, proximal hip and LE for self-care, mobility, lifting, and ambulation/stair navigation      Manual Treatments:  PROM / STM / Oscillations-Mobs:  G-I, II, III, IV (PA's, Inf., Post.)  [x] (08705) Provided manual therapy to mobilize LE, proximal hip and/or LS spine soft tissue/joints for the purpose of modulating pain, promoting relaxation, increasing ROM, reducing/eliminating soft tissue swelling/inflammation/restriction, improving soft tissue extensibility and allowing for proper ROM for normal function with self-care, mobility, lifting and ambulation. Modalities:  Ice 15';    [] GAME READY (VASO)- for significant edema, swelling, pain control. Charges:  Timed Code Treatment Minutes: 40'    Total Treatment Minutes: 40'         []rEVAL (LOW) 455 1011 (typically 20 minutes face-to-face)  [] EVAL (MOD) 83090 (typically 30 minutes face-to-face)  [] EVAL (HIGH) 57251 (typically 45 minutes face-to-face)  [] RE-EVAL     [x] JJ(17647) x 1    [] IONTO  [] NMR (72161)   [] VASO  [x] Manual (76456) x   1 [] Other:  [x] TA x   1   [] Mech Traction (06378)  [] ES(attended) (54185)      [] ES (un) (69277):         ASSESSMENT:  Pt R knee and R ankle ROM continue to improve. Demonstrated improved LE closed chain strengthening performance. GOALS:   Patient stated goal:    [] Progressing: [] Met: [] Not Met: [] Adjusted    Therapist goals for Patient:   Short Term Goals: To be achieved in: 2 weeks  1. Independent in HEP and progression per patient tolerance, in order to prevent re-injury. [] Progressing: [] Met: [] Not Met: [] Adjusted   2. Patient will have a decrease in pain to facilitate improvement in movement, function, and ADLs as indicated by Functional Deficits. [] Progressing: [] Met: [] Not Met: [] Adjusted    Long Term Goals: To be achieved in: 5/28    12 weeks   1: I with hep  [x] Progressing: [] Met: [] Not Met: [] Adjusted   2: 0-140 degrees passive   [x] Progressing: [] Met: [] Not Met: [] Adjusted   3: 4/5 knee strength   [x] Progressing: [] Met: [] Not Met: [] Adjusted   4.   Mild gait deviations only   [x] Progressing: [] Met: [] Not Met: [] Adjusted     Overall Progression Towards Functional goals/ Treatment Progress Update:  [] Patient is progressing as expected towards functional goals listed. [] Progression is slowed due to complexities/Impairments listed. [] Progression has been slowed due to co-morbidities. [x] Plan just implemented, too soon to assess goals progression <30days   [] Goals require adjustment due to lack of progress  [] Patient is not progressing as expected and requires additional follow up with physician  [] Other    Prognosis for POC: [x] Good [] Fair  [] Poor      Patient requires continued skilled intervention: [x] Yes  [] No    Treatment/Activity Tolerance:  [x] Patient able to complete treatment  [] Patient limited by fatigue  [] Patient limited by pain    [] Patient limited by other medical complications  [x] Other:                 Return to Play: (if applicable)   []  Stage 1: Intro to Strength   []  Stage 2: Return to Run and Strength   []  Stage 3: Return to Jump and Strength   []  Stage 4: Dynamic Strength and Agility   []  Stage 5: Sport Specific Training     []  Ready to Return to Play, Meets All Above Stages   []  Not Ready for Return to Sports   Comments:                               PLAN:   [x] Continue per plan of care [] Alter current plan (see comments above)  [] Plan of care initiated [] Hold pending MD visit [] Discharge    Therapist was present, directed the patient's care, made skilled judgement, and was responsible for assessment and treatment of the patient. Electronically signed by: MARCOS Bella,  Harpal Jara, PT, MPT   Note: If patient does not return for scheduled/ recommended follow up visits, this note will serve as a discharge from care along with most recent update on progress.

## 2020-08-18 ENCOUNTER — HOSPITAL ENCOUNTER (OUTPATIENT)
Dept: PHYSICAL THERAPY | Age: 20
Setting detail: THERAPIES SERIES
Discharge: HOME | End: 2020-08-18
Payer: COMMERCIAL

## 2020-08-18 PROCEDURE — 97110 THERAPEUTIC EXERCISES: CPT | Performed by: PHYSICAL THERAPIST

## 2020-08-18 PROCEDURE — 97530 THERAPEUTIC ACTIVITIES: CPT | Performed by: PHYSICAL THERAPIST

## 2020-08-18 PROCEDURE — 97140 MANUAL THERAPY 1/> REGIONS: CPT | Performed by: PHYSICAL THERAPIST

## 2020-08-20 NOTE — FLOWSHEET NOTE
The [address] [place] [address] and Sports Rehabilitation      Physical Therapy Treatment Note/ Progress Report:           Date:  2020  Patient Name:  Andrea Clifton    :  2000  MRN: [id number]  Restrictions/Precautions:    Medical/Treatment Diagnosis Information:  ·  Diagnosis: R52 (ICD-10-CM) - Pain  · Treatment Diagnosis: right knee pain - m25.561  S/P R Knee Dislocation; vascular reconstruction; external fixator removed - ACL/PCL reconstruction ()        Insurance/Certification information:  PT Insurance Information: anthem   Physician Information:  Referring Practitioner: dr Cruzito Ross   Has the plan of care been signed (Y/N):        []? Yes                    [x]? No         Date of Patient follow up with Physician:       Is this a Progress Report:     [x]  Yes  []  No        If Yes:  Date Range for reporting period:  Beginning   Ending     Progress report will be due (10 Rx or 30 days whichever is less):       Recertification will be due (POC Duration  / 90 days whichever is less):         Visit # Insurance Allowable Auth Required    []  Yes []  No        Functional Scale:     Date assessed:        Latex Allergy:  [x]NO      []YES  Preferred Language for Healthcare:   [x]English       []other:      Pain level:  nr/10     SUBJECTIVE:     \"I am pushing it at home. Luigi Hernandez \"         OBJECTIVE:   132 flexion    Test measurements:      RESTRICTIONS/PRECAUTIONS:     Exercises/Interventions:       Therapeutic Ex (93548) Sets/sec Reps Notes/CUES   Bike      6'          Calf stretch - towel prop/strap 5 x 30\"     Prone Ext ll/ld stretch  10'          Leg Press 30x 120#reina (abd, ext) 30x 60# -Step ups  4\" - 30x (with manual assistance)     Mini squats (foam)  30x      Wall slides  6'      Er hip stretch  5'   sls  3 x 30\" (lock brace?)     Standing slantboard  4 x 30\"  flexionator  5' (gentle)  laq (90-30)  45x mr     Leg extensions    Hamstrings  10# - 25x    15# - 20x      Weight shifts  10x each                                                      Manual Intervention (38952)      Prom/stm   22'     Patellar mobilizations   2'                                                                                                                                       Therapeutic Exercise and NMR EXR  [x] (99016) Provided verbal/tactile cueing for activities related to strengthening, flexibility, endurance, ROM for improvements in LE, proximal hip, and core control with self care, mobility, lifting, ambulation. [x] (86465) Provided verbal/tactile cueing for activities related to improving balance, coordination, kinesthetic sense, posture, motor skill, proprioception to assist with LE, proximal hip, and core control in self-care, mobility, lifting, ambulation and eccentric single leg control.      NMR and Therapeutic Activities:    [x] (22071 or 97581) Provided verbal/tactile cueing for activities related to improving balance, coordination, kinesthetic sense, posture, motor skill, proprioception and motor activation to allow for proper function of core, proximal hip and LE with self-care and ADLs and functional mobility.   [] (80680) Gait Re-education- Provided training and instruction to the patient for proper LE, core and proximal hip recruitment and positioning and eccentric body weight control with ambulation re-education including up and down stairs     Home Exercise Program:    [x] (98271) Reviewed/Progressed HEP activities related to strengthening, flexibility, endurance, ROM of core, proximal hip and LE for functional self-care, mobility, lifting and ambulation/stair navigation   [] (78044) Reviewed/Progressed HEP activities related to improving balance, coordination, kinesthetic sense, posture, motor skill, proprioception of core, proximal hip and LE for self-care, mobility, lifting, and ambulation/stair navigation      Manual Treatments:  PROM / STM / Oscillations-Mobs:  G-I, II, III, IV (PA's, Inf., Post.)  [x] (64175) Provided manual therapy to mobilize LE, proximal hip and/or LS spine soft tissue/joints for the purpose of modulating pain, promoting relaxation, increasing ROM, reducing/eliminating soft tissue swelling/inflammation/restriction, improving soft tissue extensibility and allowing for proper ROM for normal function with self-care, mobility, lifting and ambulation. Modalities:  Ice 15';    [] GAME READY (VASO)- for significant edema, swelling, pain control. Charges:  Timed Code Treatment Minutes: 46'   Total Treatment Minutes: 46'         []rEVAL (LOW) 455 1011 (typically 20 minutes face-to-face)  [] EVAL (MOD) 91571 (typically 30 minutes face-to-face)  [] EVAL (HIGH) 85833 (typically 45 minutes face-to-face)  [] RE-EVAL     [x] HA(09448) x 1    [] IONTO  [] NMR (64077)   [] VASO  [x] Manual (19947) x   1 [] Other:  [x] TA x   1   [] Mech Traction (03740)  [] ES(attended) (24863)      [] ES (un) (26624):         ASSESSMENT:  Pt R knee and R ankle ROM continue to improve. Demonstrated improved LE closed chain strengthening performance. GOALS:   Patient stated goal:    [] Progressing: [] Met: [] Not Met: [] Adjusted    Therapist goals for Patient:   Short Term Goals: To be achieved in: 2 weeks  1. Independent in HEP and progression per patient tolerance, in order to prevent re-injury. [] Progressing: [] Met: [] Not Met: [] Adjusted   2. Patient will have a decrease in pain to facilitate improvement in movement, function, and ADLs as indicated by Functional Deficits. [] Progressing: [] Met: [] Not Met: [] Adjusted    Long Term Goals: To be achieved in: 5/28    12 weeks   1: I with hep  [x] Progressing: [] Met: [] Not Met: [] Adjusted   2: 0-140 degrees passive   [x] Progressing: [] Met: [] Not Met: [] Adjusted   3: 4/5 knee strength   [x] Progressing: [] Met: [] Not Met: [] Adjusted   4.   Mild gait deviations only   [x] Progressing: [] Met: [] Not Met: [] Adjusted     Overall Progression Towards Functional goals/ Treatment Progress Update:  [] Patient is progressing as expected towards functional goals listed. [] Progression is slowed due to complexities/Impairments listed. [] Progression has been slowed due to co-morbidities. [x] Plan just implemented, too soon to assess goals progression <30days   [] Goals require adjustment due to lack of progress  [] Patient is not progressing as expected and requires additional follow up with physician  [] Other    Prognosis for POC: [x] Good [] Fair  [] Poor      Patient requires continued skilled intervention: [x] Yes  [] No    Treatment/Activity Tolerance:  [x] Patient able to complete treatment  [] Patient limited by fatigue  [] Patient limited by pain    [] Patient limited by other medical complications  [x] Other:                 Return to Play: (if applicable)   []  Stage 1: Intro to Strength   []  Stage 2: Return to Run and Strength   []  Stage 3: Return to Jump and Strength   []  Stage 4: Dynamic Strength and Agility   []  Stage 5: Sport Specific Training     []  Ready to Return to Play, Meets All Above Stages   []  Not Ready for Return to Sports   Comments:                               PLAN:   [x] Continue per plan of care [] Alter current plan (see comments above)  [] Plan of care initiated [] Hold pending MD visit [] Discharge    Therapist was present, directed the patient's care, made skilled judgement, and was responsible for assessment and treatment of the patient. Electronically signed by: MARCOS Chapman,  Harpal Jara, PT, MPT   Note: If patient does not return for scheduled/ recommended follow up visits, this note will serve as a discharge from care along with most recent update on progress.

## 2020-08-21 ENCOUNTER — HOSPITAL ENCOUNTER (OUTPATIENT)
Dept: PHYSICAL THERAPY | Age: 20
Setting detail: THERAPIES SERIES
Discharge: HOME | End: 2020-08-21
Payer: COMMERCIAL

## 2020-08-21 PROCEDURE — 97110 THERAPEUTIC EXERCISES: CPT | Performed by: PHYSICAL THERAPIST

## 2020-08-21 PROCEDURE — 97140 MANUAL THERAPY 1/> REGIONS: CPT | Performed by: PHYSICAL THERAPIST

## 2020-08-21 PROCEDURE — 97530 THERAPEUTIC ACTIVITIES: CPT | Performed by: PHYSICAL THERAPIST

## 2020-08-22 NOTE — FLOWSHEET NOTE
The [facility] and Sports Rehabilitation      Physical Therapy Treatment Note/ Progress Report:           Date:  2020  Patient Name:  Marcie Rees    :  2000  MRN: [id number]  Restrictions/Precautions:    Medical/Treatment Diagnosis Information:  ·  Diagnosis: R52 (ICD-10-CM) - Pain  · Treatment Diagnosis: right knee pain - m25.561  S/P R Knee Dislocation; vascular reconstruction; external fixator removed - ACL/PCL reconstruction ()        Insurance/Certification information:  PT Insurance Information: anthem   Physician Information:  Referring Practitioner: dr Owen Lopez   Has the plan of care been signed (Y/N):        []? Yes                    [x]? No         Date of Patient follow up with Physician:       Is this a Progress Report:     [x]  Yes  []  No        If Yes:  Date Range for reporting period:  Beginning   Ending     Progress report will be due (10 Rx or 30 days whichever is less):       Recertification will be due (POC Duration  / 90 days whichever is less):         Visit # Insurance Allowable Auth Required   28  []  Yes []  No        Functional Scale:     Date assessed:        Latex Allergy:  [x]NO      []YES  Preferred Language for Healthcare:   [x]English       []other:      Pain level: 1/10     SUBJECTIVE:     \"working really hard at home. Ronold Kanner \"         OBJECTIVE:   132 flexion    Test measurements:      RESTRICTIONS/PRECAUTIONS:     Exercises/Interventions:       Therapeutic Ex (58047) Sets/sec Reps Notes/CUES   Bike      6'          Calf stretch - towel prop/strap 5 x 30\"     Prone Ext ll/ld stretch  10'          Leg Press 30x 120#reina (abd, ext) 30x 60# -Step ups  4\" - 30x (with manual assistance)     Mini squats (foam)  30x      Wall slides  6'      Er hip stretch  5'   sls  3 x 30\" (lock brace?)     Standing slantboard  4 x 30\"  flexionator  5' (gentle)  laq (90-30)  45x mr     Leg extensions    Hamstrings  10# - 25x    15# - 20x      Weight shifts 10x each                                                      Manual Intervention (19830)      Prom/stm   22'     Patellar mobilizations   2'                                                                                                                                       Therapeutic Exercise and NMR EXR  [x] (44030) Provided verbal/tactile cueing for activities related to strengthening, flexibility, endurance, ROM for improvements in LE, proximal hip, and core control with self care, mobility, lifting, ambulation. [x] (59371) Provided verbal/tactile cueing for activities related to improving balance, coordination, kinesthetic sense, posture, motor skill, proprioception to assist with LE, proximal hip, and core control in self-care, mobility, lifting, ambulation and eccentric single leg control.      NMR and Therapeutic Activities:    [x] (10433 or 28944) Provided verbal/tactile cueing for activities related to improving balance, coordination, kinesthetic sense, posture, motor skill, proprioception and motor activation to allow for proper function of core, proximal hip and LE with self-care and ADLs and functional mobility.   [] (43604) Gait Re-education- Provided training and instruction to the patient for proper LE, core and proximal hip recruitment and positioning and eccentric body weight control with ambulation re-education including up and down stairs     Home Exercise Program:    [x] (02965) Reviewed/Progressed HEP activities related to strengthening, flexibility, endurance, ROM of core, proximal hip and LE for functional self-care, mobility, lifting and ambulation/stair navigation   [] (86080) Reviewed/Progressed HEP activities related to improving balance, coordination, kinesthetic sense, posture, motor skill, proprioception of core, proximal hip and LE for self-care, mobility, lifting, and ambulation/stair navigation      Manual Treatments:  PROM / STM / Oscillations-Mobs:  G-I, II, III, IV (PA's, Inf., Post.)  [x] (51185) Provided manual therapy to mobilize LE, proximal hip and/or LS spine soft tissue/joints for the purpose of modulating pain, promoting relaxation, increasing ROM, reducing/eliminating soft tissue swelling/inflammation/restriction, improving soft tissue extensibility and allowing for proper ROM for normal function with self-care, mobility, lifting and ambulation. Modalities:  Ice 15';    [] GAME READY (VASO)- for significant edema, swelling, pain control. Charges:  Timed Code Treatment Minutes: 46'   Total Treatment Minutes: 46'         []rEVAL (LOW) 455 1011 (typically 20 minutes face-to-face)  [] EVAL (MOD) 04468 (typically 30 minutes face-to-face)  [] EVAL (HIGH) 04332 (typically 45 minutes face-to-face)  [] RE-EVAL     [x] IX(98998) x 1    [] IONTO  [] NMR (77036)   [] VASO  [x] Manual (27560) x   1 [] Other:  [x] TA x   1   [] Mech Traction (21322)  [] ES(attended) (81191)      [] ES (un) (21062):         ASSESSMENT:  Pt R knee and R ankle ROM continue to improve. Demonstrated improved LE closed chain strengthening performance. GOALS:   Patient stated goal:    [] Progressing: [] Met: [] Not Met: [] Adjusted    Therapist goals for Patient:   Short Term Goals: To be achieved in: 2 weeks  1. Independent in HEP and progression per patient tolerance, in order to prevent re-injury. [] Progressing: [] Met: [] Not Met: [] Adjusted   2. Patient will have a decrease in pain to facilitate improvement in movement, function, and ADLs as indicated by Functional Deficits. [] Progressing: [] Met: [] Not Met: [] Adjusted    Long Term Goals: To be achieved in: 5/28    12 weeks   1: I with hep  [x] Progressing: [] Met: [] Not Met: [] Adjusted   2: 0-140 degrees passive   [x] Progressing: [] Met: [] Not Met: [] Adjusted   3: 4/5 knee strength   [x] Progressing: [] Met: [] Not Met: [] Adjusted   4.   Mild gait deviations only   [x] Progressing: [] Met: [] Not Met: [] Adjusted     Overall Progression Towards Functional goals/ Treatment Progress Update:  [] Patient is progressing as expected towards functional goals listed. [] Progression is slowed due to complexities/Impairments listed. [] Progression has been slowed due to co-morbidities. [x] Plan just implemented, too soon to assess goals progression <30days   [] Goals require adjustment due to lack of progress  [] Patient is not progressing as expected and requires additional follow up with physician  [] Other    Prognosis for POC: [x] Good [] Fair  [] Poor      Patient requires continued skilled intervention: [x] Yes  [] No    Treatment/Activity Tolerance:  [x] Patient able to complete treatment  [] Patient limited by fatigue  [] Patient limited by pain    [] Patient limited by other medical complications  [x] Other:                 Return to Play: (if applicable)   []  Stage 1: Intro to Strength   []  Stage 2: Return to Run and Strength   []  Stage 3: Return to Jump and Strength   []  Stage 4: Dynamic Strength and Agility   []  Stage 5: Sport Specific Training     []  Ready to Return to Play, Meets All Above Stages   []  Not Ready for Return to Sports   Comments:                               PLAN:   [x] Continue per plan of care [] Alter current plan (see comments above)  [] Plan of care initiated [] Hold pending MD visit [] Discharge    Therapist was present, directed the patient's care, made skilled judgement, and was responsible for assessment and treatment of the patient. Electronically signed by: Vijaya Laguna, SPT,  Harpal Jara, PT, MPT   Note: If patient does not return for scheduled/ recommended follow up visits, this note will serve as a discharge from care along with most recent update on progress.

## 2020-08-24 ENCOUNTER — HOSPITAL ENCOUNTER (OUTPATIENT)
Dept: PHYSICAL THERAPY | Age: 20
Setting detail: THERAPIES SERIES
Discharge: HOME | End: 2020-08-24
Payer: COMMERCIAL

## 2020-08-24 PROCEDURE — 97110 THERAPEUTIC EXERCISES: CPT | Performed by: PHYSICAL THERAPIST

## 2020-08-24 PROCEDURE — 97140 MANUAL THERAPY 1/> REGIONS: CPT | Performed by: PHYSICAL THERAPIST

## 2020-08-24 PROCEDURE — 97530 THERAPEUTIC ACTIVITIES: CPT | Performed by: PHYSICAL THERAPIST

## 2020-08-24 NOTE — FLOWSHEET NOTE
The [address] [place] [address] and Sports Rehabilitation      Physical Therapy Treatment Note/ Progress Report:           Date:  2020  Patient Name:  Krystle Ku    :  2000  MRN: [id number]  Restrictions/Precautions:    Medical/Treatment Diagnosis Information:  ·  Diagnosis: R52 (ICD-10-CM) - Pain  · Treatment Diagnosis: right knee pain - m25.561  S/P R Knee Dislocation; vascular reconstruction; external fixator removed - ACL/PCL reconstruction ()        Insurance/Certification information:  PT Insurance Information: anthem   Physician Information:  Referring Practitioner: dr Hay Can   Has the plan of care been signed (Y/N):        []? Yes                    [x]? No         Date of Patient follow up with Physician:       Is this a Progress Report:     [x]  Yes  []  No        If Yes:  Date Range for reporting period:  Beginning   Ending     Progress report will be due (10 Rx or 30 days whichever is less):       Recertification will be due (POC Duration  / 90 days whichever is less):         Visit # Insurance Allowable Auth Required    []  Yes []  No        Functional Scale:     Date assessed:        Latex Allergy:  [x]NO      []YES  Preferred Language for Healthcare:   [x]English       []other:      Pain level: 1/10     SUBJECTIVE:   Pt states he is noticing improvements in both motion, strength, and function.          OBJECTIVE:   132 flexion    Test measurements:      RESTRICTIONS/PRECAUTIONS:     Exercises/Interventions:       Therapeutic Ex (52710) Sets/sec Reps Notes/CUES   Bike      6'          Calf stretch - towel prop/strap 5 x 30\"     Prone Ext ll/ld stretch  10'          Leg Press/ SL Press 30x 120# / 30x 40#reina (abd, ext) 30x 75# -Step ups  4\"+ foam - 30x      LSD 4\"- 30x     Mini squats (foam)  30x      Wall slides  6'      Er hip stretch  5'   sls  3 x 30\"   Brace unlocked   Standing slantboard  4 x 30\"  flexionator  5' (gentle)  laq (90-30)  45x mr     Leg extensions    Hamstrings  10# - 25x    10# - 25x      Weight shifts  10x each                                                      Manual Intervention (83727)      Prom/stm   17'     Patellar mobilizations   2'                                                                                                                                       Therapeutic Exercise and NMR EXR  [x] (64048) Provided verbal/tactile cueing for activities related to strengthening, flexibility, endurance, ROM for improvements in LE, proximal hip, and core control with self care, mobility, lifting, ambulation. [x] (26512) Provided verbal/tactile cueing for activities related to improving balance, coordination, kinesthetic sense, posture, motor skill, proprioception to assist with LE, proximal hip, and core control in self-care, mobility, lifting, ambulation and eccentric single leg control.      NMR and Therapeutic Activities:    [x] (10621 or 23505) Provided verbal/tactile cueing for activities related to improving balance, coordination, kinesthetic sense, posture, motor skill, proprioception and motor activation to allow for proper function of core, proximal hip and LE with self-care and ADLs and functional mobility.   [] (30993) Gait Re-education- Provided training and instruction to the patient for proper LE, core and proximal hip recruitment and positioning and eccentric body weight control with ambulation re-education including up and down stairs     Home Exercise Program:    [x] (39814) Reviewed/Progressed HEP activities related to strengthening, flexibility, endurance, ROM of core, proximal hip and LE for functional self-care, mobility, lifting and ambulation/stair navigation   [] (14828) Reviewed/Progressed HEP activities related to improving balance, coordination, kinesthetic sense, posture, motor skill, proprioception of core, proximal hip and LE for self-care, mobility, lifting, and ambulation/stair navigation      Manual

## 2020-08-26 ENCOUNTER — HOSPITAL ENCOUNTER (OUTPATIENT)
Dept: PHYSICAL THERAPY | Age: 20
Setting detail: THERAPIES SERIES
Discharge: HOME | End: 2020-08-26
Payer: COMMERCIAL

## 2020-08-26 PROCEDURE — 97110 THERAPEUTIC EXERCISES: CPT | Performed by: PHYSICAL THERAPIST

## 2020-08-26 PROCEDURE — 97140 MANUAL THERAPY 1/> REGIONS: CPT | Performed by: PHYSICAL THERAPIST

## 2020-08-26 PROCEDURE — 97530 THERAPEUTIC ACTIVITIES: CPT | Performed by: PHYSICAL THERAPIST

## 2020-08-28 NOTE — FLOWSHEET NOTE
The [address] [place] [address] and Sports Rehabilitation      Physical Therapy Treatment Note/ Progress Report:           Date:  2020  Patient Name:  Francisco Espinal    :  2000  MRN: [id number]  Restrictions/Precautions:    Medical/Treatment Diagnosis Information:  ·  Diagnosis: R52 (ICD-10-CM) - Pain  · Treatment Diagnosis: right knee pain - m25.561  S/P R Knee Dislocation; vascular reconstruction; external fixator removed - ACL/PCL reconstruction ()        Insurance/Certification information:  PT Insurance Information: anthem   Physician Information:  Referring Practitioner: dr Daniella Lee   Has the plan of care been signed (Y/N):        []? Yes                    [x]? No         Date of Patient follow up with Physician:       Is this a Progress Report:     [x]  Yes  []  No        If Yes:  Date Range for reporting period:  Beginning   Ending     Progress report will be due (10 Rx or 30 days whichever is less):       Recertification will be due (POC Duration  / 90 days whichever is less):         Visit # Insurance Allowable Auth Required   30 22 []  Yes []  No        Functional Scale:     Date assessed:        Latex Allergy:  [x]NO      []YES  Preferred Language for Healthcare:   [x]English       []other:      Pain level: 1/10     SUBJECTIVE:   \"feeling good about things. .\"         OBJECTIVE:   132 flexion    Test measurements:      RESTRICTIONS/PRECAUTIONS:     Exercises/Interventions:       Therapeutic Ex (25924) Sets/sec Reps Notes/CUES   Bike      6'          Calf stretch - towel prop/strap 5 x 30\"     Prone Ext ll/ld stretch  10'          Leg Press/ SL Press 30x 140# / 30x 60#reina (abd, ext) 30x 75# -Step ups  4\"+ foam - 30x      LSD 4\"- 30x     Mini squats (foam)  30x      Wall slides  6'      Er hip stretch  5'   sls  3 x 30\"   Brace unlocked   Standing slantboard  4 x 30\"  flexionator  5' (gentle)  laq (90-30)  45x mr     Leg extensions    Hamstrings  10# - 25x    10# - 25x Weight shifts  10x each      Step overs  4\" with B hand support                                                Manual Intervention (36843)      Prom/stm   17'     Patellar mobilizations   2'                                                                                                                                       Therapeutic Exercise and NMR EXR  [x] (58049) Provided verbal/tactile cueing for activities related to strengthening, flexibility, endurance, ROM for improvements in LE, proximal hip, and core control with self care, mobility, lifting, ambulation. [x] (04819) Provided verbal/tactile cueing for activities related to improving balance, coordination, kinesthetic sense, posture, motor skill, proprioception to assist with LE, proximal hip, and core control in self-care, mobility, lifting, ambulation and eccentric single leg control.      NMR and Therapeutic Activities:    [x] (18450 or 12495) Provided verbal/tactile cueing for activities related to improving balance, coordination, kinesthetic sense, posture, motor skill, proprioception and motor activation to allow for proper function of core, proximal hip and LE with self-care and ADLs and functional mobility.   [] (43352) Gait Re-education- Provided training and instruction to the patient for proper LE, core and proximal hip recruitment and positioning and eccentric body weight control with ambulation re-education including up and down stairs     Home Exercise Program:    [x] (69022) Reviewed/Progressed HEP activities related to strengthening, flexibility, endurance, ROM of core, proximal hip and LE for functional self-care, mobility, lifting and ambulation/stair navigation   [] (59231) Reviewed/Progressed HEP activities related to improving balance, coordination, kinesthetic sense, posture, motor skill, proprioception of core, proximal hip and LE for self-care, mobility, lifting, and ambulation/stair navigation      Manual Treatments:  PROM / STM / Oscillations-Mobs:  G-I, II, III, IV (PA's, Inf., Post.)  [x] (15587) Provided manual therapy to mobilize LE, proximal hip and/or LS spine soft tissue/joints for the purpose of modulating pain, promoting relaxation, increasing ROM, reducing/eliminating soft tissue swelling/inflammation/restriction, improving soft tissue extensibility and allowing for proper ROM for normal function with self-care, mobility, lifting and ambulation. Modalities:  Ice 15';    [] GAME READY (VASO)- for significant edema, swelling, pain control. Charges:  Timed Code Treatment Minutes: 46'   Total Treatment Minutes: 46'         []rEVAL (LOW) 455 1011 (typically 20 minutes face-to-face)  [] EVAL (MOD) 30709 (typically 30 minutes face-to-face)  [] EVAL (HIGH) 00721 (typically 45 minutes face-to-face)  [] RE-EVAL     [x] EM(84462) x 1    [] IONTO  [] NMR (96571)   [] VASO  [x] Manual (81552) x   1 [] Other:  [x] TA x   1   [] Mech Traction (51858)  [] ES(attended) (41562)      [] ES (un) (13065):         ASSESSMENT:  Pt tolerated tx session well. R LE strength continues to improve with closed chain progression. GOALS:   Patient stated goal:    [] Progressing: [] Met: [] Not Met: [] Adjusted    Therapist goals for Patient:   Short Term Goals: To be achieved in: 2 weeks  1. Independent in HEP and progression per patient tolerance, in order to prevent re-injury. [] Progressing: [] Met: [] Not Met: [] Adjusted   2. Patient will have a decrease in pain to facilitate improvement in movement, function, and ADLs as indicated by Functional Deficits. [] Progressing: [] Met: [] Not Met: [] Adjusted    Long Term Goals: To be achieved in: 5/28    12 weeks   1: I with hep  [x] Progressing: [] Met: [] Not Met: [] Adjusted   2: 0-140 degrees passive   [x] Progressing: [] Met: [] Not Met: [] Adjusted   3: 4/5 knee strength   [x] Progressing: [] Met: [] Not Met: [] Adjusted   4.   Mild gait deviations only   [x] Progressing: [] Met: [] Not

## 2020-09-02 ENCOUNTER — HOSPITAL ENCOUNTER (OUTPATIENT)
Dept: PHYSICAL THERAPY | Age: 20
Setting detail: THERAPIES SERIES
Discharge: HOME | End: 2020-09-02
Payer: COMMERCIAL

## 2020-09-04 ENCOUNTER — HOSPITAL ENCOUNTER (OUTPATIENT)
Dept: PHYSICAL THERAPY | Age: 20
Setting detail: THERAPIES SERIES
Discharge: HOME | End: 2020-09-04
Payer: COMMERCIAL

## 2020-09-04 PROCEDURE — 97110 THERAPEUTIC EXERCISES: CPT | Performed by: SPECIALIST/TECHNOLOGIST

## 2020-09-04 PROCEDURE — 97140 MANUAL THERAPY 1/> REGIONS: CPT | Performed by: SPECIALIST/TECHNOLOGIST

## 2020-09-04 NOTE — FLOWSHEET NOTE
The [address] [place] [address] and Sports Rehabilitation      Physical Therapy Treatment Note/ Progress Report:           Date:  9/3/2020  Patient Name:  Yon Osuna    :  2000  MRN: [id number]  Restrictions/Precautions:    Medical/Treatment Diagnosis Information:  ·  Diagnosis: R52 (ICD-10-CM) - Pain  · Treatment Diagnosis: right knee pain - m25.561  S/P R Knee Dislocation; vascular reconstruction; external fixator removed - ACL/PCL reconstruction ()        Insurance/Certification information:  PT Insurance Information: anthem   Physician Information:  Referring Practitioner: dr Nery Egan   Has the plan of care been signed (Y/N):        []? Yes                    [x]? No         Date of Patient follow up with Physician:       Is this a Progress Report:     [x]  Yes  []  No        If Yes:  Date Range for reporting period:  Beginning   Ending     Progress report will be due (10 Rx or 30 days whichever is less):       Recertification will be due (POC Duration  / 90 days whichever is less):         Visit # Insurance Allowable Auth Required    []  Yes []  No        Functional Scale:     Date assessed:        Latex Allergy:  [x]NO      []YES  Preferred Language for Healthcare:   [x]English       []other:      Pain level: 1/10     SUBJECTIVE:   \"feeling good about things. .\"         OBJECTIVE:   132 flexion    Test measurements:      RESTRICTIONS/PRECAUTIONS:     Exercises/Interventions:       Therapeutic Ex (59211) Sets/sec Reps Notes/CUES   Bike      6'          Calf stretch - towel prop/strap 5 x 30\"     Prone Ext ll/ld stretch  5'          Leg Press/ SL Press 30x 140# / 30x 60#reina (abd, ext) 30x 75# -Step ups  4\"+ foam - 30x      LSD 4\"- 30x     Mini squats (foam)  30x      Wall slides  6'      Er hip stretch  5'   sls  3 x 30\"   Brace unlocked   Standing slantboard  4 x 30\"  flexionator  5' (gentle)  laq (90-30)  45x mr     Leg extensions    Hamstrings  10# - 25x    10# - 25x NV    NV      Weight shifts  10x each      Step overs  4\" with B hand support 10 reps                                                 Manual Intervention (26997)      Prom/stm   17'     Patellar mobilizations   2'                                                                                                                                       Therapeutic Exercise and NMR EXR  [x] (67757) Provided verbal/tactile cueing for activities related to strengthening, flexibility, endurance, ROM for improvements in LE, proximal hip, and core control with self care, mobility, lifting, ambulation. [x] (19704) Provided verbal/tactile cueing for activities related to improving balance, coordination, kinesthetic sense, posture, motor skill, proprioception to assist with LE, proximal hip, and core control in self-care, mobility, lifting, ambulation and eccentric single leg control.      NMR and Therapeutic Activities:    [x] (29396 or 75338) Provided verbal/tactile cueing for activities related to improving balance, coordination, kinesthetic sense, posture, motor skill, proprioception and motor activation to allow for proper function of core, proximal hip and LE with self-care and ADLs and functional mobility.   [] (69171) Gait Re-education- Provided training and instruction to the patient for proper LE, core and proximal hip recruitment and positioning and eccentric body weight control with ambulation re-education including up and down stairs     Home Exercise Program:    [x] (07924) Reviewed/Progressed HEP activities related to strengthening, flexibility, endurance, ROM of core, proximal hip and LE for functional self-care, mobility, lifting and ambulation/stair navigation   [] (03282) Reviewed/Progressed HEP activities related to improving balance, coordination, kinesthetic sense, posture, motor skill, proprioception of core, proximal hip and LE for self-care, mobility, lifting, and ambulation/stair navigation      Manual Progressing: [] Met: [] Not Met: [] Adjusted     Overall Progression Towards Functional goals/ Treatment Progress Update:  [] Patient is progressing as expected towards functional goals listed. [] Progression is slowed due to complexities/Impairments listed. [] Progression has been slowed due to co-morbidities. [x] Plan just implemented, too soon to assess goals progression <30days   [] Goals require adjustment due to lack of progress  [] Patient is not progressing as expected and requires additional follow up with physician  [] Other    Prognosis for POC: [x] Good [] Fair  [] Poor      Patient requires continued skilled intervention: [x] Yes  [] No    Treatment/Activity Tolerance:  [x] Patient able to complete treatment  [] Patient limited by fatigue  [] Patient limited by pain    [] Patient limited by other medical complications  [x] Other:                 Return to Play: (if applicable)   []  Stage 1: Intro to Strength   []  Stage 2: Return to Run and Strength   []  Stage 3: Return to Jump and Strength   []  Stage 4: Dynamic Strength and Agility   []  Stage 5: Sport Specific Training     []  Ready to Return to Play, Meets All Above Stages   []  Not Ready for Return to Sports   Comments:                               PLAN:   [x] Continue per plan of care [] Alter current plan (see comments above)  [] Plan of care initiated [] Hold pending MD visit [] Discharge    Therapist was present, directed the patient's care, made skilled judgement, and was responsible for assessment and treatment of the patient. Electronically signed by:  Oswaldo Guevara, PTA  [id number], Hermelinda Ambriz, PT, MPT   Note: If patient does not return for scheduled/ recommended follow up visits, this note will serve as a discharge from care along with most recent update on progress.

## 2020-09-09 ENCOUNTER — OFFICE VISIT (OUTPATIENT)
Dept: ORTHOPEDIC SURGERY | Age: 20
End: 2020-09-09
Payer: COMMERCIAL

## 2020-09-09 VITALS — BODY MASS INDEX: 27.04 KG/M2 | WEIGHT: 229 LBS | HEIGHT: 77 IN

## 2020-09-09 PROCEDURE — 99213 OFFICE O/P EST LOW 20 MIN: CPT | Performed by: ORTHOPAEDIC SURGERY

## 2020-09-09 PROCEDURE — L1810 KO ELASTIC WITH JOINTS: HCPCS | Performed by: ORTHOPAEDIC SURGERY

## 2020-09-09 NOTE — PROGRESS NOTES
MD Zuleyma Adam, [place]      Orthopaedic Surgery and Sports Medicine      Patient Name: Gianna Flowers  YOB: 2000  Patient's PCP is Deepthi Ortega MD      SUBJECTIVE  Chief Complaint  Knee Pain (RIGHT    SX 05/19/2020  ACL,PCL)    Post op right knee arthroscopy with ACL and a double bundle PCL reconstruction with exploration of the peroneal nerve, as well as heel cord lengthening  Date of Surgery: 05/19/2020      History of Present Illness:  Gianna Flowers is a 22 y.o. male  Here for follow up of right knee arthroscopy with ACL/PCL reconstruction with exploration of peroneal nerve and heel cord lengthening. He is now about 4months postop and continuing to improve. Is now able to put his foot in a regular shoe with the AFO in place, but reports he is able to put his whole foot on the ground and has been ambulating better and better. He feels his ankle range of motion has improved, but is still unable to actively dorsiflex. He continues to wear his [place] PCL knee brace as well. He is ambulating with 1 crutch and is doing better weightbearing. The patient denies fever, wound drainage, increasing redness, pus, increasing swelling. He continues with physical therapy once a week. He was going twice a week however insurance will no longer cover physical therapy sessions so he is cut back. Pain Assessment: 4-5/10 (at times)      OBJECTIVE  PHYSICAL EXAM  Vital Signs: There were no vitals filed for this visit. Body mass index is 27.16 kg/m². General Appearance: Patient is adequately groomed with no evidence of malnutrition   Orientation: Patient is alert and oriented to person, place and time. Mood and Affect: Neutral/Euthymic(normal)    Right Knee Examination  There is no warmth, erythema, or purulent drainage over the incision. Skin is intact. No ecchymosis noted today. No wound or skin concerns.     The sensation is intact to light touch in the

## 2020-09-22 ENCOUNTER — HOSPITAL ENCOUNTER (OUTPATIENT)
Dept: PHYSICAL THERAPY | Age: 20
Setting detail: THERAPIES SERIES
Discharge: HOME | End: 2020-09-22
Payer: COMMERCIAL

## 2020-09-22 PROCEDURE — 97110 THERAPEUTIC EXERCISES: CPT | Performed by: SPECIALIST/TECHNOLOGIST

## 2020-09-22 NOTE — FLOWSHEET NOTE
with B hand support 10 reps                                                 Manual Intervention ([id number])                                                                                                                                              Therapeutic Exercise and NMR EXR  [x] (94230) Provided verbal/tactile cueing for activities related to strengthening, flexibility, endurance, ROM for improvements in LE, proximal hip, and core control with self care, mobility, lifting, ambulation. [x] (63351) Provided verbal/tactile cueing for activities related to improving balance, coordination, kinesthetic sense, posture, motor skill, proprioception to assist with LE, proximal hip, and core control in self-care, mobility, lifting, ambulation and eccentric single leg control.      NMR and Therapeutic Activities:    [x] (52307 or 75650) Provided verbal/tactile cueing for activities related to improving balance, coordination, kinesthetic sense, posture, motor skill, proprioception and motor activation to allow for proper function of core, proximal hip and LE with self-care and ADLs and functional mobility.   [] (15305) Gait Re-education- Provided training and instruction to the patient for proper LE, core and proximal hip recruitment and positioning and eccentric body weight control with ambulation re-education including up and down stairs     Home Exercise Program:    [x] (91182) Reviewed/Progressed HEP activities related to strengthening, flexibility, endurance, ROM of core, proximal hip and LE for functional self-care, mobility, lifting and ambulation/stair navigation   [] (33415) Reviewed/Progressed HEP activities related to improving balance, coordination, kinesthetic sense, posture, motor skill, proprioception of core, proximal hip and LE for self-care, mobility, lifting, and ambulation/stair navigation      Manual Treatments:  PROM / STM / Oscillations-Mobs:  G-I, II, III, IV (PA's, Inf., Post.)  [x] (69601) Provided manual therapy to mobilize LE, proximal hip and/or LS spine soft tissue/joints for the purpose of modulating pain, promoting relaxation, increasing ROM, reducing/eliminating soft tissue swelling/inflammation/restriction, improving soft tissue extensibility and allowing for proper ROM for normal function with self-care, mobility, lifting and ambulation. Modalities:  Ice 15';    [] GAME READY (VASO)- for significant edema, swelling, pain control. Charges:  Timed Code Treatment Minutes: 46'   Total Treatment Minutes: 46'         []rEVAL (LOW) 455 1011 (typically 20 minutes face-to-face)  [] EVAL (MOD) 66312 (typically 30 minutes face-to-face)  [] EVAL (HIGH) 46838 (typically 45 minutes face-to-face)  [] RE-EVAL     [x] MR(11340) x 2    [] IONTO  [] NMR (52166)  [] VASO  [] Manual (68573) x    [] Other:  [] TA x      [] Mech Traction (40581)  [] ES(attended) (11066)      [] ES (un) (06976):         ASSESSMENT:  Pt tolerated tx session well. R LE strength continues to improve with closed chain progression. GOALS:   Patient stated goal:    [] Progressing: [] Met: [] Not Met: [] Adjusted    Therapist goals for Patient:   Short Term Goals: To be achieved in: 2 weeks  1. Independent in HEP and progression per patient tolerance, in order to prevent re-injury. [] Progressing: [] Met: [] Not Met: [] Adjusted   2. Patient will have a decrease in pain to facilitate improvement in movement, function, and ADLs as indicated by Functional Deficits. [] Progressing: [] Met: [] Not Met: [] Adjusted    Long Term Goals: To be achieved in: 5/28    12 weeks   1: I with hep  [x] Progressing: [] Met: [] Not Met: [] Adjusted   2: 0-140 degrees passive   [x] Progressing: [] Met: [] Not Met: [] Adjusted   3: 4/5 knee strength   [x] Progressing: [] Met: [] Not Met: [] Adjusted   4.   Mild gait deviations only   [x] Progressing: [] Met: [] Not Met: [] Adjusted     Overall Progression Towards Functional goals/ Treatment Progress Update:  [] Patient is progressing as expected towards functional goals listed. [] Progression is slowed due to complexities/Impairments listed. [] Progression has been slowed due to co-morbidities. [x] Plan just implemented, too soon to assess goals progression <30days   [] Goals require adjustment due to lack of progress  [] Patient is not progressing as expected and requires additional follow up with physician  [] Other    Prognosis for POC: [x] Good [] Fair  [] Poor      Patient requires continued skilled intervention: [x] Yes  [] No    Treatment/Activity Tolerance:  [x] Patient able to complete treatment  [] Patient limited by fatigue  [] Patient limited by pain    [] Patient limited by other medical complications  [x] Other:                 Return to Play: (if applicable)   []  Stage 1: Intro to Strength   []  Stage 2: Return to Run and Strength   []  Stage 3: Return to Jump and Strength   []  Stage 4: Dynamic Strength and Agility   []  Stage 5: Sport Specific Training     []  Ready to Return to Play, Meets All Above Stages   []  Not Ready for Return to Sports   Comments:                               PLAN:   [x] Continue per plan of care [] Alter current plan (see comments above)  [] Plan of care initiated [] Hold pending MD visit [] Discharge    Therapist was present, directed the patient's care, made skilled judgement, and was responsible for assessment and treatment of the patient. Electronically signed by:  Oswaldo Guevara, PTA  [id number], Hermelinda Ambriz, PT, MPT   Note: If patient does not return for scheduled/ recommended follow up visits, this note will serve as a discharge from care along with most recent update on progress.

## 2020-09-29 ENCOUNTER — HOSPITAL ENCOUNTER (OUTPATIENT)
Dept: PHYSICAL THERAPY | Age: 20
Setting detail: THERAPIES SERIES
Discharge: HOME | End: 2020-09-29
Payer: COMMERCIAL

## 2020-09-29 NOTE — FLOWSHEET NOTE
The [address] [facility] [address], [place]      Physical Therapy  Cancellation/No-show Note  Patient Name:  Rg Hayes  :  2000   Date:  2020  Cancelled visits to date: 0  No-shows to date: 1    For today's appointment patient:  []  Cancelled  []  Rescheduled appointment  [x]  No-show     Reason given by patient:  []  Patient ill  []  Conflicting appointment  []  No transportation    []  Conflict with work  []  No reason given  []  Other:     Comments:      Electronically signed by:  Irena Baxter, [address]williams

## 2020-10-07 ENCOUNTER — HOSPITAL ENCOUNTER (OUTPATIENT)
Dept: PHYSICAL THERAPY | Age: 20
Setting detail: THERAPIES SERIES
Discharge: HOME | End: 2020-10-07
Payer: COMMERCIAL

## 2020-10-07 PROCEDURE — 97110 THERAPEUTIC EXERCISES: CPT | Performed by: SPECIALIST/TECHNOLOGIST

## 2020-10-07 NOTE — FLOWSHEET NOTE
[address] [facility]       Physical Therapy Treatment Note/ Progress Report:           Date:  10/7/2020  Patient Name:  Lillian White    :  2000  MRN: [id number]  Restrictions/Precautions:    Medical/Treatment Diagnosis Information:  ·  Diagnosis: R52 (ICD-10-CM) - Pain  · Treatment Diagnosis: right knee pain - m25.561  S/P R Knee Dislocation; vascular reconstruction; external fixator removed - ACL/PCL reconstruction ()        Insurance/Certification information:  PT Insurance Information: [employer]   Physician Information:  Referring Practitioner: dr Toni Horn   Has the plan of care been signed (Y/N):        []? Yes                    [x]? No         Date of Patient follow up with Physician:       Is this a Progress Report:     [x]  Yes  []  No        If Yes:  Date Range for reporting period:  Beginning   Ending     Progress report will be due (10 Rx or 30 days whichever is less):       Recertification will be due (POC Duration  / 90 days whichever is less):         Visit # Insurance Allowable Auth Required   34 22 []  Yes []  No        Functional Scale:     Date assessed:        Latex Allergy:  [x]NO      []YES  Preferred Language for Healthcare:   [x]English       []other:      Pain level: 1/10     SUBJECTIVE:   \"I am able to walk a mile and then my leg will get sore. \"        OBJECTIVE:   135 flexion    Test measurements:      RESTRICTIONS/PRECAUTIONS:     Exercises/Interventions:       Therapeutic Ex (88999) Sets/sec Reps Notes/CUES   Bike      6'                      Leg Press/ SL Press 30x 140# / 30x 80# shortmah (abd, ext) 30x 60# -Step ups  4\"+ foam - 30x   6' x 20     LSD 4\"- 30x     Mini squats (foam)  30x          Er hip stretch  5'  HEP sls  3 x 30\"      Standing slantboard  4 x 30\"  flexionator  2' (gentle)  SLR flex X 20mr     laq (90-30)  45x mr     Leg extensions     Hamstrings  10# - 25x    12# - 25x  NV    NV          Step overs 4\" with B hand support 10 reps       Lateral walk with HHA from pole 2 laps                                          Manual Intervention (61897)                                                                                                                                              Therapeutic Exercise and NMR EXR  [x] (87510) Provided verbal/tactile cueing for activities related to strengthening, flexibility, endurance, ROM for improvements in LE, proximal hip, and core control with self care, mobility, lifting, ambulation. [x] (80393) Provided verbal/tactile cueing for activities related to improving balance, coordination, kinesthetic sense, posture, motor skill, proprioception to assist with LE, proximal hip, and core control in self-care, mobility, lifting, ambulation and eccentric single leg control.      NMR and Therapeutic Activities:    [x] (03513 or 00366) Provided verbal/tactile cueing for activities related to improving balance, coordination, kinesthetic sense, posture, motor skill, proprioception and motor activation to allow for proper function of core, proximal hip and LE with self-care and ADLs and functional mobility.   [] (39614) Gait Re-education- Provided training and instruction to the patient for proper LE, core and proximal hip recruitment and positioning and eccentric body weight control with ambulation re-education including up and down stairs     Home Exercise Program:    [x] (23403) Reviewed/Progressed HEP activities related to strengthening, flexibility, endurance, ROM of core, proximal hip and LE for functional self-care, mobility, lifting and ambulation/stair navigation   [] (47331) Reviewed/Progressed HEP activities related to improving balance, coordination, kinesthetic sense, posture, motor skill, proprioception of core, proximal hip and LE for self-care, mobility, lifting, and ambulation/stair navigation      Manual Treatments:  PROM / STM / Oscillations-Mobs:  G-I, II, III, IV (PA's, Inf., Post.)  [x] (33422) Provided manual therapy to mobilize LE, proximal hip and/or LS spine soft tissue/joints for the purpose of modulating pain, promoting relaxation, increasing ROM, reducing/eliminating soft tissue swelling/inflammation/restriction, improving soft tissue extensibility and allowing for proper ROM for normal function with self-care, mobility, lifting and ambulation. Modalities:  Ice 15';    [] GAME READY (VASO)- for significant edema, swelling, pain control. Charges:  Timed Code Treatment Minutes: 46'   Total Treatment Minutes: 46'         []rEVAL (LOW) 455 1011 (typically 20 minutes face-to-face)  [] EVAL (MOD) 23379 (typically 30 minutes face-to-face)  [] EVAL (HIGH) 51488 (typically 45 minutes face-to-face)  [] RE-EVAL     [x] CW(25282) x 2    [] IONTO  [] NMR (75505)  [] VASO  [] Manual (86829) x    [] Other:  [] TA x      [] Mech Traction (63396)  [] ES(attended) (10803)      [] ES (un) (99444):         ASSESSMENT:  Pt tolerated tx session well. R LE strength continues to improve with closed chain progression. GOALS:   Patient stated goal:    [] Progressing: [] Met: [] Not Met: [] Adjusted    Therapist goals for Patient:   Short Term Goals: To be achieved in: 2 weeks  1. Independent in HEP and progression per patient tolerance, in order to prevent re-injury. [] Progressing: [] Met: [] Not Met: [] Adjusted   2. Patient will have a decrease in pain to facilitate improvement in movement, function, and ADLs as indicated by Functional Deficits. [] Progressing: [] Met: [] Not Met: [] Adjusted    Long Term Goals: To be achieved in: 5/28    12 weeks   1: I with hep  [x] Progressing: [] Met: [] Not Met: [] Adjusted   2: 0-140 degrees passive   [x] Progressing: [] Met: [] Not Met: [] Adjusted   3: 4/5 knee strength   [x] Progressing: [] Met: [] Not Met: [] Adjusted   4.   Mild gait deviations only   [x] Progressing: [] Met: [] Not Met: [] Adjusted     Overall Progression Towards Functional goals/ Treatment Progress Update:  [] Patient is progressing as expected towards functional goals listed. [] Progression is slowed due to complexities/Impairments listed. [] Progression has been slowed due to co-morbidities. [x] Plan just implemented, too soon to assess goals progression <30days   [] Goals require adjustment due to lack of progress  [] Patient is not progressing as expected and requires additional follow up with physician  [] Other    Prognosis for POC: [x] Good [] Fair  [] Poor      Patient requires continued skilled intervention: [x] Yes  [] No    Treatment/Activity Tolerance:  [x] Patient able to complete treatment  [] Patient limited by fatigue  [] Patient limited by pain    [] Patient limited by other medical complications  [x] Other:       Return to Play: (if applicable)   []  Stage 1: Intro to Strength   []  Stage 2: Return to Run and Strength   []  Stage 3: Return to Jump and Strength   []  Stage 4: Dynamic Strength and Agility   []  Stage 5: Sport Specific Training     []  Ready to Return to Play, Meets All Above Stages   []  Not Ready for Return to Sports   Comments:                               PLAN:   [x] Continue per plan of care [] Alter current plan (see comments above)  [] Plan of care initiated [] Hold pending MD visit [] Discharge          Electronically signed by:  Irena Baxter, PTA  [id number], Conrado Charles, PT, MPT   Note: If patient does not return for scheduled/ recommended follow up visits, this note will serve as a discharge from care along with most recent update on progress.

## 2020-10-14 ENCOUNTER — HOSPITAL ENCOUNTER (OUTPATIENT)
Dept: PHYSICAL THERAPY | Age: 20
Setting detail: THERAPIES SERIES
Discharge: HOME | End: 2020-10-14
Payer: COMMERCIAL

## 2020-10-14 PROCEDURE — 97110 THERAPEUTIC EXERCISES: CPT | Performed by: SPECIALIST/TECHNOLOGIST

## 2020-10-14 NOTE — FLOWSHEET NOTE
Hamstrings  10# - 25x    12# - 25x  NV    NV          Step overs  4\" with B hand support 10 reps       Lateral walk  2 laps      staircase 6' 7 sets                                  Manual Intervention (12150)                                                                                                                                              Therapeutic Exercise and NMR EXR  [x] (71037) Provided verbal/tactile cueing for activities related to strengthening, flexibility, endurance, ROM for improvements in LE, proximal hip, and core control with self care, mobility, lifting, ambulation. [x] (14774) Provided verbal/tactile cueing for activities related to improving balance, coordination, kinesthetic sense, posture, motor skill, proprioception to assist with LE, proximal hip, and core control in self-care, mobility, lifting, ambulation and eccentric single leg control.      NMR and Therapeutic Activities:    [x] (25373 or 94300) Provided verbal/tactile cueing for activities related to improving balance, coordination, kinesthetic sense, posture, motor skill, proprioception and motor activation to allow for proper function of core, proximal hip and LE with self-care and ADLs and functional mobility.   [] (20812) Gait Re-education- Provided training and instruction to the patient for proper LE, core and proximal hip recruitment and positioning and eccentric body weight control with ambulation re-education including up and down stairs     Home Exercise Program:    [x] (61563) Reviewed/Progressed HEP activities related to strengthening, flexibility, endurance, ROM of core, proximal hip and LE for functional self-care, mobility, lifting and ambulation/stair navigation   [] (40608) Reviewed/Progressed HEP activities related to improving balance, coordination, kinesthetic sense, posture, motor skill, proprioception of core, proximal hip and LE for self-care, mobility, lifting, and ambulation/stair navigation Manual Treatments:  PROM / STM / Oscillations-Mobs:  G-I, II, III, IV (PA's, Inf., Post.)  [x] (99550) Provided manual therapy to mobilize LE, proximal hip and/or LS spine soft tissue/joints for the purpose of modulating pain, promoting relaxation, increasing ROM, reducing/eliminating soft tissue swelling/inflammation/restriction, improving soft tissue extensibility and allowing for proper ROM for normal function with self-care, mobility, lifting and ambulation. Modalities:  Ice 15';    [] GAME READY (VASO)- for significant edema, swelling, pain control. Charges:  Timed Code Treatment Minutes: 46'   Total Treatment Minutes: 46'         []rEVAL (LOW) 455 1011 (typically 20 minutes face-to-face)  [] EVAL (MOD) 95776 (typically 30 minutes face-to-face)  [] EVAL (HIGH) 89759 (typically 45 minutes face-to-face)  [] RE-EVAL     [x] WH(10329) x 2    [] IONTO  [] NMR (90864)  [] VASO  [] Manual (66138) x    [] Other:  [] TA x      [] Mech Traction (94270)  [] ES(attended) (66969)      [] ES (un) (20629):         ASSESSMENT:  Pt tolerated tx session well. R LE strength continues to improve with closed chain progression. GOALS:   Patient stated goal:    [] Progressing: [] Met: [] Not Met: [] Adjusted    Therapist goals for Patient:   Short Term Goals: To be achieved in: 2 weeks  1. Independent in HEP and progression per patient tolerance, in order to prevent re-injury. [] Progressing: [] Met: [] Not Met: [] Adjusted   2. Patient will have a decrease in pain to facilitate improvement in movement, function, and ADLs as indicated by Functional Deficits. [] Progressing: [] Met: [] Not Met: [] Adjusted    Long Term Goals: To be achieved in: 5/28    12 weeks   1: I with hep  [x] Progressing: [] Met: [] Not Met: [] Adjusted   2: 0-140 degrees passive   [x] Progressing: [] Met: [] Not Met: [] Adjusted   3: 4/5 knee strength   [x] Progressing: [] Met: [] Not Met: [] Adjusted   4.   Mild gait deviations only   [x] Progressing: [] Met: [] Not Met: [] Adjusted     Overall Progression Towards Functional goals/ Treatment Progress Update:  [] Patient is progressing as expected towards functional goals listed. [] Progression is slowed due to complexities/Impairments listed. [] Progression has been slowed due to co-morbidities. [x] Plan just implemented, too soon to assess goals progression <30days   [] Goals require adjustment due to lack of progress  [] Patient is not progressing as expected and requires additional follow up with physician  [] Other    Prognosis for POC: [x] Good [] Fair  [] Poor      Patient requires continued skilled intervention: [x] Yes  [] No    Treatment/Activity Tolerance:  [x] Patient able to complete treatment  [] Patient limited by fatigue  [] Patient limited by pain    [] Patient limited by other medical complications  [x] Other:       Return to Play: (if applicable)   []  Stage 1: Intro to Strength   []  Stage 2: Return to Run and Strength   []  Stage 3: Return to Jump and Strength   []  Stage 4: Dynamic Strength and Agility   []  Stage 5: Sport Specific Training     []  Ready to Return to Play, Meets All Above Stages   []  Not Ready for Return to Sports   Comments:                               PLAN:   [x] Continue per plan of care [] Alter current plan (see comments above)  [] Plan of care initiated [] Hold pending MD visit [] Discharge          Electronically signed by:  Rosa Elena Green, PTA  [id number], Linda Parnell, PT, MPT   Note: If patient does not return for scheduled/ recommended follow up visits, this note will serve as a discharge from care along with most recent update on progress.

## 2020-10-21 ENCOUNTER — OFFICE VISIT (OUTPATIENT)
Dept: ORTHOPEDIC SURGERY | Age: 20
End: 2020-10-21
Payer: COMMERCIAL

## 2020-10-21 VITALS — BODY MASS INDEX: 27.04 KG/M2 | HEIGHT: 77 IN | WEIGHT: 229 LBS

## 2020-10-21 PROCEDURE — 99213 OFFICE O/P EST LOW 20 MIN: CPT | Performed by: ORTHOPAEDIC SURGERY

## 2020-10-21 NOTE — PROGRESS NOTES
Benjaman Dakin, MD Fransico Kennedy, [place]      Orthopaedic Surgery and Sports Medicine      Patient Name: German Bowling  YOB: 2000  Patient's PCP is Dat Mckeon MD      SUBJECTIVE  Chief Complaint  Knee Pain (RIGHT   SX 05/19/2020   ACL, PCL)    Post op right knee arthroscopy with ACL and a double bundle PCL reconstruction with exploration of the peroneal nerve, as well as heel cord lengthening  Date of Surgery: 05/19/2020      History of Present Illness:  German Bowling is a 22 y.o. male  Here for follow up of right knee arthroscopy with ACL/PCL reconstruction with exploration of peroneal nerve and heel cord lengthening. He is now approximately 10 months post injury. And 5 months post surgery  He is now wearing a simple brace on his knee. He is ambulating independently. And has walked up to a mile. He is continuing announce his outpatient physical therapy once a week. Unfortunately he continues to require his AFO because he still cannot actively dorsiflex his foot. Pain Assessment: 1-3/10 (at times)    OBJECTIVE  PHYSICAL EXAM  Vital Signs: There were no vitals filed for this visit. Body mass index is 27.16 kg/m². General Appearance: Patient is adequately groomed with no evidence of malnutrition   Orientation: Patient is alert and oriented to person, place and time. Mood and Affect: Neutral/Euthymic(normal)    Right Knee Examination  There is no warmth, erythema, or purulent drainage over the incision. Skin is intact. No ecchymosis noted today. No wound or skin concerns. The sensation is intact to light touch in the ipsilateral lower extremity. The ipsilateral lower extremity motor function appears intact. Less swelling in his leg, foot and ankle. His range of motion at his knee is approximately 0 to 120 degrees. He still lacks the ability to actively dorsiflex. Passively his ankle range of motion has improved.   Today I watched him ambulate he above tests. He is welcome to call for an appointment sooner if he has any additional concerns or questions. This dictation was performed with a verbal recognition program (DRAGON) and it was checked for errors. It is possible that there are still dictated errors within this office note. If so, please bring any errors to my attention for an addendum. All efforts were made to ensure that this office note is accurate.

## 2020-10-22 ENCOUNTER — HOSPITAL ENCOUNTER (OUTPATIENT)
Dept: PHYSICAL THERAPY | Age: 20
Setting detail: THERAPIES SERIES
Discharge: HOME | End: 2020-10-22
Payer: COMMERCIAL

## 2020-10-28 ENCOUNTER — OFFICE VISIT (OUTPATIENT)
Dept: ORTHOPEDIC SURGERY | Age: 20
End: 2020-10-28
Payer: COMMERCIAL

## 2020-10-28 PROCEDURE — 95886 MUSC TEST DONE W/N TEST COMP: CPT | Performed by: PHYSICAL MEDICINE & REHABILITATION

## 2020-10-28 PROCEDURE — 95908 NRV CNDJ TST 3-4 STUDIES: CPT | Performed by: PHYSICAL MEDICINE & REHABILITATION

## 2020-10-28 NOTE — PROGRESS NOTES
[address] MEDICINE      Patient: Carlton Vallejo. Age: 22 Years 8 Months  Sex: Male Date: 10/28/2020  YOB: 2000 Ref. Phys.: Dr Gonzalez Kamara  Notes:  right peroneal mononeuropathy; persistent right foot weakness      Sensory NCS      L SURAL: no response      Motor NCS      R COMM PERONEAL - EDB: no response      R TIBIAL (KNEE) - AH: no response      EMG Summary Table     Spontaneous MUAP Recruit. Ins. Act Fibs. PSW Fasics. H.F. Amp. Dur. Poly's. Pattern   R. GASTROCN (MED) N None None None None + + N N   R. SOLEUS N None None None None + + N N   R. TIB ANTERIOR +++ +++ +++ None None - - N -   R. PERON LONGUS ++ ++ + None None - - N -   R. VAST MEDIALIS N None None None None N N N N   R. BIC FEM (S HEAD) N None None None None N N N N   R. GLUTEUS MAX N None None None None N N N N    N None None None None N N N N       Summary: Right tibial, peroneal and sural responses are not obtainable. Monopolar exam shows moderate acute ongoing axonal loss without voluntary motor unit recruitment to left common peroneal muscles. Large amplitude motor units are noted to distal right tibial muscles below the knee. Impression: Abnormal examination. There is electrodiagnostic evidence of:    1. 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 Findings are characterized by significant ongoing axonal loss and no voluntary motor unit recruitment. Findings are unchanged compared to 3/30/2020 EMG/NCS  2. 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 Findings characterized by re-innervation changes and preserved axonal continuity.               Janice Oleary MD

## 2020-10-30 ENCOUNTER — TELEPHONE (OUTPATIENT)
Dept: ORTHOPEDIC SURGERY | Age: 20
End: 2020-10-30

## 2020-11-04 NOTE — PROGRESS NOTES
Chief Complaint    No chief complaint on file. History of Present Illness:  Soto Taylor is a 22 y.o. male who I was asked to see in consultation by Dr. Adam Pool for evaluation of they are chief complaint of right ankle foot drop. He states that on 12/27/2019 he was drinking and was running as fast as he could in the dark jumped and landed awkwardly on his right leg. He had immediate pain and was found to have a right knee dislocation. He was in [facility] and was taken immediately to the local hospital and seen by vascular surgeon. He underwent fasciotomies and reduction of his knee dislocation. Dr. Lily Rao eventually did his ACL PCL surgery when he came back to [facility] and has had a foot drop since his original injury. Recent EMG done by Dr. Perla Vasques showed no significant peroneal nerve recruitment. In further discussion it was felt that this nerve injury would be permanent. Patient states that he walks with a AFO and has very minimal limitation. He does stretching on a regular basis and is doing his own physical therapy    Medical History:  Patient's medications, allergies, past medical, surgical, social and family histories were reviewed and updated as appropriate. Review of Systems:  Pertinent items are noted in HPI  Review of systems reviewed from Patient History Form dated on 11/5/2020 and available in the patient's chart under the Media tab. Vital Signs: There were no vitals taken for this visit. General Exam:   Constitutional: Patient is adequately groomed with no evidence of malnutrition  DTRs: Deep tendon reflexes are intact  Mental Status: The patient is oriented to time, place and person. The patient's mood and affect are appropriate. Lymphatic: The lymphatic examination bilaterally reveals all areas to be without enlargement or induration. Ankle Examination:    Inspection: Obvious atrophy through his right lower extremity.   He has well-healed fasciotomy scars proximal and medial on the lower leg. Incisions are well-healed    Palpation: He has no tenderness to palpation about the knee or ankle    Range of Motion: 0 degrees of passive ankle dorsiflexion 50 degrees of active plantarflexion he has full range of motion at the knee    Strength: 0 out of 5 into dorsiflexion extension of the toes and eversion 3+/5 strength into plantarflexion inversion and flexion of the toes    Special Tests: Anterior drawer and talar tilt show mild laxity    Skin: There are no rashes, ulcerations or lesions. Gait: Ataxic    Reflex 2+ and symmetric    Additional Comments:       Additional Examinations:         Left Lower Extremity: Examination of the left lower extremity does not show any tenderness, deformity or injury. Range of motion is unremarkable. There is no gross instability. There are no rashes, ulcerations or lesions. Strength and tone are normal.     Radiology:     X-rays obtained and reviewed in office:  Views   Location   Impression     Assessment : This patient had a right knee dislocation and subsequent peroneal nerve injury. He has a foot drop with EMG documented lack of further recruitment of the peroneal nerve. Office Procedures:  No orders of the defined types were placed in this encounter. Treatment Plan:  The etiology of foot drop was discussed in great detail including the nonoperative and operative options. All questions were answered. Nonoperative option includes activity modification, immobilization with cast or boot, support using brace shoes and inserts, medicines when appropriate, physical therapy, injections when indicated, and topicals. Operative option includes posterior tib tendon transfer through the interosseous membrane to the cuboid versus doing a TTC fusion. .  The patient will start on the following treatment regimen: He is going to continue with his physical therapy and will followup with me in 3 months at which point we will reevaluate his muscle function. I have evaluated the patient myself and completed the examination of the patient on date of visit.  Have discussed the case and reviewed all pertinent data with the patient

## 2020-11-05 ENCOUNTER — OFFICE VISIT (OUTPATIENT)
Dept: ORTHOPEDIC SURGERY | Age: 20
End: 2020-11-05
Payer: COMMERCIAL

## 2020-11-05 VITALS — WEIGHT: 229.06 LBS | BODY MASS INDEX: 27.05 KG/M2 | HEIGHT: 77 IN

## 2020-11-05 PROCEDURE — 99213 OFFICE O/P EST LOW 20 MIN: CPT | Performed by: ORTHOPAEDIC SURGERY

## 2020-12-16 ENCOUNTER — OFFICE VISIT (OUTPATIENT)
Dept: ORTHOPEDIC SURGERY | Age: 20
End: 2020-12-16
Payer: COMMERCIAL

## 2020-12-16 VITALS — WEIGHT: 229 LBS | BODY MASS INDEX: 27.04 KG/M2 | HEIGHT: 77 IN

## 2020-12-16 PROCEDURE — 99213 OFFICE O/P EST LOW 20 MIN: CPT | Performed by: ORTHOPAEDIC SURGERY

## 2020-12-16 NOTE — PROGRESS NOTES
Rudolfo Sicard, MD [place], [place]      Orthopaedic Surgery and Sports Medicine      Patient Name: Cooper Patel  YOB: 2000  Patient's PCP is Marialuisa Terrell MD      SUBJECTIVE  Chief Complaint  Knee Pain (right knee arthroscopy with ACL and a double bundle PCL reconstruction with exploration of the peroneal nerve, as well as heel cord lengthening)    Post op right knee arthroscopy with ACL and a double bundle PCL reconstruction with exploration of the peroneal nerve, as well as heel cord lengthening  Date of Surgery: 05/19/2020      History of Present Illness:  Cooper Patel is a 22 y.o. male  Here for follow up of right knee arthroscopy with ACL/PCL reconstruction with exploration of peroneal nerve and heel cord lengthening. He is now approximately 12 months post injury. And 7 months post surgery  His independent ambulation continues to improve. He still wears a very simple knee brace. Also uses his AFO for his peroneal nerve palsy. He has completed his outpatient physical therapy for his knee and now is attending a workout gym on a regular basis. Pain Assessment: 1-3/10 (at times)    OBJECTIVE  PHYSICAL EXAM  Vital Signs: There were no vitals filed for this visit. Body mass index is 27.16 kg/m². General Appearance: Patient is adequately groomed with no evidence of malnutrition   Orientation: Patient is alert and oriented to person, place and time. Mood and Affect: Neutral/Euthymic(normal)    Right Knee Examination  There is no warmth, erythema, or purulent drainage over the incision. Skin is intact. No ecchymosis noted today. No wound or skin concerns. The sensation is intact to light touch in the ipsilateral lower extremity. The ipsilateral lower extremity motor function appears intact. His range of motion at his knee is approximately 0 to 130 degrees. He still lacks the ability to actively dorsiflex. He continues to improve.

## 2021-01-05 ENCOUNTER — OFFICE VISIT (OUTPATIENT)
Dept: PRIMARY CARE CLINIC | Age: 21
End: 2021-01-05
Payer: COMMERCIAL

## 2021-01-05 DIAGNOSIS — Z20.822 SUSPECTED COVID-19 VIRUS INFECTION: Primary | ICD-10-CM

## 2021-01-05 LAB — SARS-COV-2: DETECTED

## 2021-01-05 PROCEDURE — 99211 OFF/OP EST MAY X REQ PHY/QHP: CPT | Performed by: NURSE PRACTITIONER

## 2021-01-05 NOTE — PROGRESS NOTES
Chelsey Page received a viral test for COVID-19. They were educated on isolation and quarantine as appropriate. For any symptoms, they were directed to seek care from their PCP, given contact information to establish with a doctor, directed to an urgent care or the emergency room.

## 2021-01-20 ENCOUNTER — OFFICE VISIT (OUTPATIENT)
Dept: ORTHOPEDIC SURGERY | Age: 21
End: 2021-01-20
Payer: COMMERCIAL

## 2021-01-20 VITALS — HEIGHT: 77 IN | BODY MASS INDEX: 27.05 KG/M2 | WEIGHT: 229.06 LBS

## 2021-01-20 DIAGNOSIS — G57.31 NEUROPATHY OF RIGHT PERONEAL NERVE: Primary | ICD-10-CM

## 2021-01-20 PROCEDURE — 99212 OFFICE O/P EST SF 10 MIN: CPT | Performed by: ORTHOPAEDIC SURGERY

## 2021-01-20 NOTE — PROGRESS NOTES
Subjective: Patient is here for follow-up of his right foot drop. He states that he noticed a little bit of improvement he wanted to see if there was anything else he should be doing. Is only using his muscle stem maybe twice a week. He is using a spring leaf AFO  Objective: Physical exam shows he has 0 out of 5 ankle dorsiflexion and eversion. There is no anterior lateral compartment function. He has 3+ to 4-/5 plantarflexion inversion 3+ to 4-/5 plantarflexion and 3/5 FHL. I can get him to neutral ankle dorsiflexion plantarflexion but he is starting to get tight  Imaging:  Assessment and plan: We again discussed operative and nonoperative treatments. He is not a great candidate for tendon transfer since his posterior compartment which is the only one left is weak. He needs to work on his flexibility so that if he does get some return he will be able to move the range of motion. At this point surgically the only option would be to do a TTC fusion. Nonoperatively I would have him continue with his muscle stem but do this daily.   He will use his AFO follow-up with me in March at which point I would repeat his EMG if there is no recruitment then I would be much quicker to do a TTC fusion instead of waiting out for possible tendon transfer

## 2021-01-21 ENCOUNTER — HOSPITAL ENCOUNTER (OUTPATIENT)
Dept: PHYSICAL THERAPY | Age: 21
Setting detail: THERAPIES SERIES
Discharge: HOME | End: 2021-01-21
Payer: COMMERCIAL

## 2021-01-21 PROCEDURE — 97112 NEUROMUSCULAR REEDUCATION: CPT | Performed by: PHYSICAL THERAPIST

## 2021-01-21 PROCEDURE — 97140 MANUAL THERAPY 1/> REGIONS: CPT | Performed by: PHYSICAL THERAPIST

## 2021-01-21 PROCEDURE — 97161 PT EVAL LOW COMPLEX 20 MIN: CPT | Performed by: PHYSICAL THERAPIST

## 2021-01-21 PROCEDURE — 97110 THERAPEUTIC EXERCISES: CPT | Performed by: PHYSICAL THERAPIST

## 2021-01-22 NOTE — FLOWSHEET NOTE
[facility] Orthopaedics and [address] [place] [address]; [facility]         Physical Therapy Treatment Note/ Progress Report:           Date:  2021  Patient Name:  Chelsey Page    :  2000  MRN: [id number]  Restrictions/Precautions:    Medical/Treatment Diagnosis Information:  · Diagnosis: m25.371 - ankle and foot weakness  ·    Insurance/Certification information:     Physician Information:  Referring Practitioner: dr Claire Flores  Has the plan of care been signed (Y/N):        []  Yes  [x]  No     Date of Patient follow up with Physician:       Is this a Progress Report:     []  Yes  [x]  No        If Yes:  Date Range for reporting period:  Beginning  Ending    Progress report will be due (10 Rx or 30 days whichever is less):       Recertification will be due (POC Duration  / 90 days whichever is less):         Visit # Insurance Allowable Auth Required   1  []  Yes []  No        Functional Scale:    Date assessed:       Latex Allergy:  [x]NO      []YES  Preferred Language for Healthcare:   [x]English       []other:      Pain level:  6/10     SUBJECTIVE:  See eval    OBJECTIVE: See eval   Observation:    Test measurements:      RESTRICTIONS/PRECAUTIONS:     Exercises/Interventions:       Therapeutic Ex (43666) Sets/sec Reps Notes/CUES   nmr with df  20'     Strap stretch   6 x 20\"     Prone df passive   5'     Weight shift mary ankle mob's   20x                                                     Manual Intervention ([id number])      Prom/stm   20'                                                                                                                                             Therapeutic Exercise and NMR EXR  [x] (48174) Provided verbal/tactile cueing for activities related to strengthening, flexibility, endurance, ROM for improvements in LE, proximal hip, and core control with self care, mobility, lifting, ambulation.   [x] (27691) Provided verbal/tactile cueing for activities related to improving balance, coordination, kinesthetic sense, posture, motor skill, proprioception to assist with LE, proximal hip, and core control in self-care, mobility, lifting, ambulation and eccentric single leg control. NMR and Therapeutic Activities:    [x] (41050 or 34971) Provided verbal/tactile cueing for activities related to improving balance, coordination, kinesthetic sense, posture, motor skill, proprioception and motor activation to allow for proper function of core, proximal hip and LE with self-care and ADLs and functional mobility.   [] (54906) Gait Re-education- Provided training and instruction to the patient for proper LE, core and proximal hip recruitment and positioning and eccentric body weight control with ambulation re-education including up and down stairs     Home Exercise Program:    [x] (24151) Reviewed/Progressed HEP activities related to strengthening, flexibility, endurance, ROM of core, proximal hip and LE for functional self-care, mobility, lifting and ambulation/stair navigation   [] (35207) Reviewed/Progressed HEP activities related to improving balance, coordination, kinesthetic sense, posture, motor skill, proprioception of core, proximal hip and LE for self-care, mobility, lifting, and ambulation/stair navigation      Manual Treatments:  PROM / STM / Oscillations-Mobs:  G-I, II, III, IV (PA's, Inf., Post.)  [x] (19946) Provided manual therapy to mobilize LE, proximal hip and/or LS spine soft tissue/joints for the purpose of modulating pain, promoting relaxation, increasing ROM, reducing/eliminating soft tissue swelling/inflammation/restriction, improving soft tissue extensibility and allowing for proper ROM for normal function with self-care, mobility, lifting and ambulation. Modalities:     [] GAME READY (VASO)- for significant edema, swelling, pain control.      Charges:  Timed Code Treatment Minutes: 28'    Total Treatment Minutes: 72'       [x] EVAL (LOW) 27915 (typically 20 minutes face-to-face)  [] EVAL (MOD) 72380 (typically 30 minutes face-to-face)  [] EVAL (HIGH) 96946 (typically 45 minutes face-to-face)  [] RE-EVAL     [x] YV(48777) x   1 [] IONTO  [] NMR (09941) x     [] VASO  [x] Manual (18122) x    1 [] Other:  [] TA x      [] Mech Traction (19112)  [] ES(attended) (46172)      [] ES (un) (40220):         ASSESSMENT:  See eval      GOALS:   Patient stated goal:    [] Progressing: [] Met: [] Not Met: [] Adjusted    Therapist goals for Patient:   Short Term Goals: To be achieved in: 2 weeks  1. Independent in HEP and progression per patient tolerance, in order to prevent re-injury. [] Progressing: [] Met: [] Not Met: [] Adjusted   2. Patient will have a decrease in pain to facilitate improvement in movement, function, and ADLs as indicated by Functional Deficits. [] Progressing: [] Met: [] Not Met: [] Adjusted    Long Term Goals: To be achieved in:  8  weeks  - The patient is expected to demonstrate less than 10% impairment, limitation or restriction in:  - walking and moving around (mobility)  -- Patient will demonstrate increased passive and active ROM to full, symmetrical and painfree to allow for proper joint functioning as indicated by patients Functional Deficits. [] Progressing: [] Met: [] Not Met: [] Adjusted  - Patient will demonstrate an increase in Strength to full and painfree to resistance testing to allow for proper functional mobility as indicated by patients Functional Deficits. [] Progressing: [] Met: [] Not Met: [] Adjusted  - Patient will return to desired, higher level,  functional activities without increased symptoms or restriction. [] Progressing: [] Met: [] Not Met: [] Adjusted      Overall Progression Towards Functional goals/ Treatment Progress Update:  [] Patient is progressing as expected towards functional goals listed.     [] Progression is slowed due to complexities/Impairments listed. [] Progression has been slowed due to co-morbidities. [x] Plan just implemented, too soon to assess goals progression <30days   [] Goals require adjustment due to lack of progress  [] Patient is not progressing as expected and requires additional follow up with physician  [] Other    Prognosis for POC: [x] Good [] Fair  [] Poor      Patient requires continued skilled intervention: [x] Yes  [] No    Treatment/Activity Tolerance:  [x] Patient able to complete treatment  [] Patient limited by fatigue  [] Patient limited by pain    [] Patient limited by other medical complications  [] Other:         Return to Play: (if applicable)   []  Stage 1: Intro to Strength   []  Stage 2: Return to Run and Strength   []  Stage 3: Return to Jump and Strength   []  Stage 4: Dynamic Strength and Agility   []  Stage 5: Sport Specific Training     []  Ready to Return to Play, Meets All Above Stages   []  Not Ready for Return to Sports   Comments:                               PLAN: See eval  [] Continue per plan of care [] Alter current plan (see comments above)  [x] Plan of care initiated [] Hold pending MD visit [] Discharge      Electronically signed by:  Ila Strange, PT, MPT     Note: If patient does not return for scheduled/ recommended follow up visits, this note will serve as a discharge from care along with most recent update on progress.

## 2021-01-22 NOTE — PLAN OF CARE
The [address] [facility], Brad Prader   E Merly Isabel, [place], [facility]  Phone: [phone]   Fax:     [phone]                                                       Physical Therapy Certification    Dear Referring Practitioner: dr Leidy Giang,    We had the pleasure of evaluating the following patient for physical therapy services at [address] [place] [address]. A summary of our findings can be found in the initial assessment below. This includes our plan of care. If you have any questions or concerns regarding these findings, please do not hesitate to contact me at the office phone number checked above. Thank you for the referral.       Physician Signature:_______________________________Date:__________________  By signing above (or electronic signature), therapists plan is approved by physician      Patient: Shira Guevara   : 2000   MRN: [id number]  Referring Physician: Referring Practitioner: dr Leidy Giang      Evaluation Date: 2021     Medical Diagnosis Information:  Diagnosis: E64.408 - ankle and foot weakness                                             Insurance information:       Precautions/ Contra-indications:   Latex Allergy:  [x]NO      []YES  Preferred Language for Healthcare:   [x]English       []Other:  C-SSRS Triggered by Intake questionnaire (Past 2 wk assessment):   [x] No, Questionnaire did not trigger screening.   [] Yes, Patient intake triggered further evaluation      [] C-SSRS Screening completed  [] PCP notified via Plan of Care  [] Emergency services notified      SUBJECTIVE: Patient stated complaint: \"still working to improve my footdrop from a year ago. ... md feels that some nmr would help facilitate strength and mobility. ...... Kendrick Dooley      Relevant Medical History:  Right acl/pcl; knee dislocation     Functional Disability Index: 48% lefs      Pain Scale: 3/10    Easing factors: rest     Provocative factors: Motion/activity     Night Pain:  - denied pain at night    Type:      - Intermittent      Paresthesia complaints:   - no paresthesia complaints       Functional Limitations/Impairments:   - Standing   - Walking      - Squatting/bending    - Stairs             - ADL's   - Sports/Recreation         Occupation/School:   Living Status/Prior Level of Function: This patient was independent in ADL's and IADL's prior to onset of symptoms. Sport/recreational activities:   - golf   - tennis   - basketball   - resistive training   - cardiovascular training   - bowling      PACEMAKER:  - Denied having a pacemaker that would contraindicate the use of electrical modalities. METAL IMPLANTS:  - Denied metal implants that would contraindicate the use of thermal modalities. CANCER HISTORY:  - Denied a history of cancer that would contraindicate thermal modalities. OBJECTIVE:        Joint mobility:     Hypo      Palpation:     Functional Mobility/Transfers:     Posture:     Circumferential Measures:    ROM:     Strength/Neuromuscular control:   Right ankle: 2+/5 right ankle df; 3-/5 ever; 3/5  inv    Bandages/Dressings/Incisions:     Gait: decreased right stance time - with AFO     Dermatomal Sensation:  - Dermatomal sensation was intact to light touch bilaterally throughout upper and lower extremities. Deep tendon reflexes:  - DTR's were symmetrical and intact throughout. - 0 - (absent/areflexia)    - 1 - (diminished/hyporeflexia)  - 2 - (average/normal)  - 3 - (exaggerated/brisk)   - 4 - (clonus/very brisk/hyperreflexic)    Orthopedic Special Tests:                [x] Patient history, allergies, meds reviewed. Medical chart reviewed. See intake form. Review Of Systems (ROS):  [x]Performed Review of systems (Integumentary, CardioPulmonary, Neurological) by intake and observation. Intake form has been scanned into medical record.  Patient has been instructed to contact their primary care physician regarding ROS issues if not already being addressed at this time. Objective Review of Systems:  Cognitive, Communication, Behavior and Learning:  - The patient was alert, spoke coherently and was oriented to person, place and time. - Demonstrated no barriers to communication or processing information.  - Appeared literate, spoke [place] and had no significant hearing or vision impairment. - Had no abnormal behavioral responses, learning preferences or educational needs. Cardiopulmonary:  Edema:     Integumentary:  Nail beds:  Skin appearance:    Co-morbidities/Complexities (which may affect course of rehabilitation):   []Morbid obesity (E66.01)   []Uncontrolled HTN (I10) []DM type 1(E10.65) or 2 (E11.65) []RA(M05.9)/OA(M19.91)  []None  []other:    MEDICARE CAP EXCEPTION DOCUMENTATION  I certify that this patient meets one of the below criteria necessary for becoming an exception to the Medicare cap on therapy services:  []  The patient has a condition that has a direct and significant impact on the need for therapy. (Significantly impacts the rate of recovery)  []  The patient has a complexity that has a direct and significant impact on the need for therapy. (Significantly impacts the rate of recovery and is associated with a primary condition.)       []  The patient has associated variables that influence the amount of treatment to include:  Social support, self-efficacy/motivation, prognosis, time since onset/acuity. []  The patient has generalized musculoskeletal conditions or a condition affecting multiple sites that will have a direct impact on the rate of recovery. []  The patient had a prior episode of outpatient therapy during this calendar year for a different condition. []  The patient has a mental or cognitive disorder in addition to the condition being treated that will have a direct and significant impact on the rate of recovery. ASSESSMENT:    The patient demonstrated at least  % but less than  % impairment, limitation or restriction in:   - walking and moving around (mobility)   Functional Impairments:     [x]Noted lumbar/proximal hip/LE hypomobility   [x]Decreased LE functional ROM   [x]Decreased core/proximal hip strength and neuromuscular control   [x]Decreased LE functional strength   [x]Reduced balance/proprioceptive control   []other:      Functional Activity Limitations (from functional questionnaire and intake)   [x]Reduced ability to tolerate prolonged functional positions   [x]Reduced ability or difficulty with changes of positions or transfers between positions   [x]Reduced ability to maintain good posture and demonstrate good body mechanics with sitting, bending, and lifting   [x]Reduced ability to sleep   [x] Reduced ability or tolerance with driving and/or computer work   [x]Reduced ability to perform lifting, carrying tasks   [x]Reduced ability to squat   [x]Reduced ability to forward bend   [x]Reduced ability to ambulate prolonged functional periods/distances/surfaces   [x]Reduced ability to ascend/descend stairs   [x]Reduced ability to run, hop or jump    Participation Restrictions   [x]Reduced participation in self care activities   [x]Reduced participation in home management activities   [x]Reduced participation in work activities   [x]Reduced participation in social activities. [x]Reduced participation in sport activities. Classification :    []Signs/symptoms consistent with post-surgical status including decreased ROM, strength and function.    []Signs/symptoms consistent with joint sprain/strain   []Signs/symptoms consistent with patella-femoral syndrome   []Signs/symptoms consistent with knee OA/hip OA   []Signs/symptoms consistent with internal derangement of knee/Hip   []Signs/symptoms consistent with functional hip weakness/NMR control      []Signs/symptoms consistent with tendinitis/tendinosis    []signs/symptoms consistent with pathology which may benefit from Dry needling      [x]other:  Signs/symptoms consistent with weakness    Classification :     - ligament or other connective tissue dysfunction. (Pattern 4D)  - localized inflammation. (Pattern 4E)    Prognosis/Rehab Potential:     - Fair         Tolerance of evaluation/treatment:     - Good     Physical Therapy Evaluation Complexity Justification  [] A history of present problem with:  [] no personal factors and/or comorbidities that impact the plan of care;  []1-2 personal factors and/or comorbidities that impact the plan of care  []3 personal factors and/or comorbidities that impact the plan of care  [x] An examination of body systems using standardized tests and measures addressing any of the following: body structures and functions (impairments), activity limitations, and/or participation restrictions;:  [x] a total of 1-2 or more elements   [] a total of 3 or more elements   [] a total of 4 or more elements   [x] A clinical presentation with:  [] stable and/or uncomplicated characteristics   [x] evolving clinical presentation with changing characteristics  [] unstable and unpredictable characteristics;   [x] Clinical decision making of [x] low, [] moderate, [] high complexity using standardized patient assessment instrument and/or measurable assessment of functional outcome.     [x] EVAL (LOW) 36945 (typically 30 minutes face-to-face)  [] EVAL (MOD) 12682 (typically 30 minutes face-to-face)  [] EVAL (HIGH) 08962 (typically 45 minutes face-to-face)  [] RE-EVAL         Co-morbidities/Complexities (which will affect course of rehabilitation):   []None           Arthritic conditions   []Rheumatoid arthritis (M05.9)  []Osteoarthritis (M19.91)   Cardiovascular conditions   []Hypertension (I10)  []Hyperlipidemia (E78.5)  []Angina pectoris (I20)  []Atherosclerosis (I70)   Musculoskeletal conditions   []Disc pathology []Congenital spine pathologies   []Prior surgical intervention  []Osteoporosis (M81.8)  []Osteopenia (M85.8)   Endocrine conditions   []Hypothyroid (E03.9)  []Hyperthyroid Gastrointestinal conditions   []Constipation (Z49.45)   Metabolic conditions   []Morbid obesity (E66.01)  []Diabetes type 1(E10.65) or 2 (E11.65)   []Neuropathy (G60.9)     Pulmonary conditions   []Asthma (J45)  []Coughing   []COPD (J44.9)   Psychological Disorders  []Anxiety (F41.9)  []Depression (F32.9)   []Other:   [x]Other:     Previous knee dislocation        PLAN  Frequency/Duration:  2 days per week for 8 Weeks:  Interventions:  - Therapeutic exercise including: strength training, ROM/flexibility, NMR and proprioception for the proximal hip, trunk and Lower extremity  - Manual therapy as indicated including Dry Needling/IASTM, STM, PROM, Gr I-IV mobilizations, spinal mobilization/manipulation.   - Modalities as needed including: thermal agents, E-stim, US, iontophoresis as indicated. - Patient education on joint protection, activity modification, progression of HEP. HEP instruction: nmr with df    GOALS:  Patient stated goal: \"walk without an afo\"    Therapist goals for Patient:   Short Term Goals: To be achieved in: 2 weeks  - Independent in HEP and progression per patient tolerance, in order to prevent re-injury. - Patient will have a decrease in pain to facilitate improvement in movement, function, and ADLs as indicated by Functional Deficits. Long Term Goals: To be achieved in: 8  weeks  - The patient is expected to demonstrate less than 10% impairment, limitation or restriction in:  - walking and moving around (mobility)  -- Patient will demonstrate increased passive and active ROM to full, symmetrical and painfree to allow for proper joint functioning as indicated by patients Functional Deficits.    - Patient will demonstrate an increase in Strength to full and painfree to resistance testing to allow for proper functional mobility as indicated by patients Functional Deficits. - Patient will return to desired, higher level,  functional activities without increased symptoms or restriction.       Electronically signed by:  Judi Boyd PT, MPT

## 2021-01-27 ENCOUNTER — OFFICE VISIT (OUTPATIENT)
Dept: ORTHOPEDIC SURGERY | Age: 21
End: 2021-01-27
Payer: COMMERCIAL

## 2021-01-27 VITALS — HEIGHT: 77 IN | WEIGHT: 229 LBS | BODY MASS INDEX: 27.04 KG/M2

## 2021-01-27 DIAGNOSIS — S83.511A RUPTURE OF ANTERIOR CRUCIATE LIGAMENT OF RIGHT KNEE, INITIAL ENCOUNTER: ICD-10-CM

## 2021-01-27 DIAGNOSIS — S83.521A: Primary | ICD-10-CM

## 2021-01-27 DIAGNOSIS — G57.31 NEUROPATHY OF RIGHT PERONEAL NERVE: ICD-10-CM

## 2021-01-27 DIAGNOSIS — Z98.890 S/P RIGHT KNEE ARTHROSCOPY: ICD-10-CM

## 2021-01-27 PROBLEM — S83.106A: Status: ACTIVE | Noted: 2021-01-27

## 2021-01-27 PROBLEM — S85.009A: Status: ACTIVE | Noted: 2021-01-27

## 2021-01-27 PROBLEM — M21.371 RIGHT FOOT DROP: Status: ACTIVE | Noted: 2020-03-16

## 2021-01-27 PROCEDURE — 99213 OFFICE O/P EST LOW 20 MIN: CPT | Performed by: ORTHOPAEDIC SURGERY

## 2021-01-27 NOTE — PROGRESS NOTES
Kendrick Damico MD  [facility], [place]      Orthopaedic Surgery and Sports Medicine      Patient Name: Sally Gresham  YOB: 2000  Patient's PCP is Sly Greer MD      SUBJECTIVE  Chief Complaint  Post-Op Check (right knee)    Post op right knee arthroscopy with ACL and a double bundle PCL reconstruction with exploration of the peroneal nerve, as well as heel cord lengthening  Date of Surgery: 05/19/2020      History of Present Illness:  Sally Gresham is a 23 y.o. male  Here for follow up of right knee arthroscopy with ACL/PCL reconstruction with exploration of peroneal nerve and heel cord lengthening. His independent ambulation continues to improve. No longer wears a knee brace. 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 He now works out almost daily at [employer]. He has started to see some flicker of motion in his foot is for his dorsiflexion. Pain Assessment: 01/10 (at times)    OBJECTIVE  PHYSICAL EXAM  Vital Signs: There were no vitals filed for this visit. Body mass index is 27.15 kg/m². General Appearance: Patient is adequately groomed with no evidence of malnutrition   Orientation: Patient is alert and oriented to person, place and time. Mood and Affect: Neutral/Euthymic(normal)    Right Knee Examination  There is no warmth, erythema, or purulent drainage over the incision. Skin is intact. No ecchymosis noted today. No wound or skin concerns. The sensation is intact to light touch in the ipsilateral lower extremity. The ipsilateral lower extremity motor function appears intact. His range of motion at his knee is approximately 0 to 130 degrees. He is very stable to varus and valgus stress. He has a negative Lachman's I think he is a +2 PCL today. So his PCL is a little bit loose but certainly not terrible I think if we can get some additional quad strength that that'll probably help control his PCL he'll be fine.   Diagnostics: No new x-rays taken today      ASSESSMENT (Medical Decision Making)    Chelsey Page is a 23 y.o. male progressing well from right knee arthroscopy with ACL/PCL reconstruction with exploration and neurolysis of the peroneal nerve as well as heel cord lengthening      PLAN (Medical Decision Making)  Office Procedures:  No orders of the defined types were placed in this encounter. Treatment Plan:  Current plan is for him to see me in 6 weeks. We'll continue to monitor his foot and ankle progress as well as his quad and hamstring strength. We see him in 6 weeks we probably should repeat x-rays of his knee that would be standing views of the right knee. Non-steroidal anti-inflammatories medications (NSAIDs) can be used to assist with pain control and to reduce post op inflammatory changes. These medications may be over-the-counter or prescribed. We discussed taking the NSAID properly and the precautions. The patient understands that this medication may potentially interfere with other medications. Patient was also instructed to immediately discontinue the medication is there is any possible complication. Chelsey Page was instructed to call the office if his symptoms worsen or if new symptoms appear prior to the next scheduled visit. He is specifically instructed to contact the office between now and schedule appointment if he has concerns related to his condition or if he needs assistance in scheduling any above tests. He is welcome to call for an appointment sooner if he has any additional concerns or questions. This dictation was performed with a verbal recognition program (DRAGON) and it was checked for errors. It is possible that there are still dictated errors within this office note. If so, please bring any errors to my attention for an addendum. All efforts were made to ensure that this office note is accurate.

## 2021-01-29 ENCOUNTER — HOSPITAL ENCOUNTER (OUTPATIENT)
Dept: PHYSICAL THERAPY | Age: 21
Setting detail: THERAPIES SERIES
Discharge: HOME | End: 2021-01-29
Payer: COMMERCIAL

## 2021-01-29 PROCEDURE — 97112 NEUROMUSCULAR REEDUCATION: CPT | Performed by: PHYSICAL THERAPIST

## 2021-01-29 PROCEDURE — 97110 THERAPEUTIC EXERCISES: CPT | Performed by: PHYSICAL THERAPIST

## 2021-01-29 PROCEDURE — 97140 MANUAL THERAPY 1/> REGIONS: CPT | Performed by: PHYSICAL THERAPIST

## 2021-01-31 NOTE — FLOWSHEET NOTE
The [facility] Orthopaedics and Sports Rehabilitation; [place]             Physical Therapy Treatment Note/ Progress Report:           Date:  2021  Patient Name:  Sally Gresham    :  2000  MRN: [id number]  Restrictions/Precautions:    Medical/Treatment Diagnosis Information:    Diagnosis: m25.371 - ankle and foot weakness     Insurance/Certification information:     Physician Information:     Has the plan of care been signed (Y/N):        []  Yes  [x]  No     Date of Patient follow up with Physician:       Is this a Progress Report:     []  Yes  [x]  No        If Yes:  Date Range for reporting period:  Beginning  Ending    Progress report will be due (10 Rx or 30 days whichever is less):       Recertification will be due (POC Duration  / 90 days whichever is less):         Visit # Insurance Allowable Auth Required   2  []  Yes []  No        Functional Scale:    Date assessed:       Latex Allergy:  [x]NO      []YES  Preferred Language for Healthcare:   [x]English       []other:      Pain level:  6/10     SUBJECTIVE:  \"I am feeling pretty good. Nanci Barnhartorneileen Brownlee \"     OBJECTIVE:    5 df    Observation:    Test measurements:      RESTRICTIONS/PRECAUTIONS:     Exercises/Interventions:       Therapeutic Ex (91078) Sets/sec Reps Notes/CUES   nmr with df  20'     Strap stretch   6 x 20\"     Prone df passive   5'     Weight shift mulligan ankle mob's   20x     Press: B, single   30x     Cheo:  Abd, ext   30x 30x                                         Manual Intervention (33818)      Prom/stm   20'                                                                                                                                             Therapeutic Exercise and NMR EXR  [x] (33968) Provided verbal/tactile cueing for activities related to strengthening, flexibility, endurance, ROM for improvements in LE, proximal hip, and core control with self care, mobility, lifting, ambulation. [x] (47843) Provided verbal/tactile cueing for activities related to improving balance, coordination, kinesthetic sense, posture, motor skill, proprioception to assist with LE, proximal hip, and core control in self-care, mobility, lifting, ambulation and eccentric single leg control. NMR and Therapeutic Activities:    [x] (66673 or 23457) Provided verbal/tactile cueing for activities related to improving balance, coordination, kinesthetic sense, posture, motor skill, proprioception and motor activation to allow for proper function of core, proximal hip and LE with self-care and ADLs and functional mobility.   [] (69220) Gait Re-education- Provided training and instruction to the patient for proper LE, core and proximal hip recruitment and positioning and eccentric body weight control with ambulation re-education including up and down stairs     Home Exercise Program:    [x] (14123) Reviewed/Progressed HEP activities related to strengthening, flexibility, endurance, ROM of core, proximal hip and LE for functional self-care, mobility, lifting and ambulation/stair navigation   [] (12730) Reviewed/Progressed HEP activities related to improving balance, coordination, kinesthetic sense, posture, motor skill, proprioception of core, proximal hip and LE for self-care, mobility, lifting, and ambulation/stair navigation      Manual Treatments:  PROM / STM / Oscillations-Mobs:  G-I, II, III, IV (PA's, Inf., Post.)  [x] (05016) Provided manual therapy to mobilize LE, proximal hip and/or LS spine soft tissue/joints for the purpose of modulating pain, promoting relaxation, increasing ROM, reducing/eliminating soft tissue swelling/inflammation/restriction, improving soft tissue extensibility and allowing for proper ROM for normal function with self-care, mobility, lifting and ambulation.      Modalities:     [] GAME READY (VASO)- for significant edema, swelling, pain control. Charges:  Timed Code Treatment Minutes: 43'   Total Treatment Minutes: 43'      [] EVAL (LOW) 25206 (typically 20 minutes face-to-face)  [] EVAL (MOD) 72711 (typically 30 minutes face-to-face)  [] EVAL (HIGH) 12481 (typically 45 minutes face-to-face)  [] RE-EVAL     [x] TC(84518) x   1 [] IONTO  [x] NMR (23893) x   1  [] VASO  [x] Manual (61626) x    1 [] Other:  [] TA x      [] Mech Traction (92923)  [] ES(attended) (46481)      [] ES (un) (74756):         ASSESSMENT:  See eval      GOALS:   Patient stated goal:    [] Progressing: [] Met: [] Not Met: [] Adjusted    Therapist goals for Patient:   Short Term Goals: To be achieved in: 2 weeks  1. Independent in HEP and progression per patient tolerance, in order to prevent re-injury. [] Progressing: [] Met: [] Not Met: [] Adjusted   2. Patient will have a decrease in pain to facilitate improvement in movement, function, and ADLs as indicated by Functional Deficits. [] Progressing: [] Met: [] Not Met: [] Adjusted    Long Term Goals: To be achieved in:  8  weeks  - The patient is expected to demonstrate less than 10% impairment, limitation or restriction in:  - walking and moving around (mobility)  -- Patient will demonstrate increased passive and active ROM to full, symmetrical and painfree to allow for proper joint functioning as indicated by patients Functional Deficits. [x] Progressing: [] Met: [] Not Met: [] Adjusted  - Patient will demonstrate an increase in Strength to full and painfree to resistance testing to allow for proper functional mobility as indicated by patients Functional Deficits. [x] Progressing: [] Met: [] Not Met: [] Adjusted  - Patient will return to desired, higher level,  functional activities without increased symptoms or restriction.   [x] Progressing: [] Met: [] Not Met: [] Adjusted      Overall Progression Towards Functional goals/ Treatment Progress Update:  [] Patient is progressing as expected towards functional goals listed. [] Progression is slowed due to complexities/Impairments listed. [] Progression has been slowed due to co-morbidities. [x] Plan just implemented, too soon to assess goals progression <30days   [] Goals require adjustment due to lack of progress  [] Patient is not progressing as expected and requires additional follow up with physician  [] Other    Prognosis for POC: [x] Good [] Fair  [] Poor      Patient requires continued skilled intervention: [x] Yes  [] No    Treatment/Activity Tolerance:  [x] Patient able to complete treatment  [] Patient limited by fatigue  [] Patient limited by pain    [] Patient limited by other medical complications  [] Other:         Return to Play: (if applicable)   []  Stage 1: Intro to Strength   []  Stage 2: Return to Run and Strength   []  Stage 3: Return to Jump and Strength   []  Stage 4: Dynamic Strength and Agility   []  Stage 5: Sport Specific Training     []  Ready to Return to Play, Meets All Above Stages   []  Not Ready for Return to Sports   Comments:                               PLAN:   [x] Continue per plan of care [] Alter current plan (see comments above)  [] Plan of care initiated [] Hold pending MD visit [] Discharge      Electronically signed by:  Judi Boyd PT, MPT     Note: If patient does not return for scheduled/ recommended follow up visits, this note will serve as a discharge from care along with most recent update on progress.

## (undated) DEVICE — SET ADMIN PRIMING 7ML L30IN 7.35LB 20 GTT 2ND RLER CLMP

## (undated) DEVICE — 4.5 MM INCISOR PLUS STRAIGHT                                    BLADES, POWER/EP-1, VIOLET, PACKAGED                                    6 PER BOX, STERILE

## (undated) DEVICE — BLADE SAW SAG MIC 25.5X9.5X0.6 MM FN TOOTH FOR MICRO100

## (undated) DEVICE — INTENDED FOR TISSUE SEPARATION, AND OTHER PROCEDURES THAT REQUIRE A SHARP SURGICAL BLADE TO PUNCTURE OR CUT.: Brand: BARD-PARKER ® STAINLESS STEEL BLADES

## (undated) DEVICE — GLOVE,SURG,SENSICARE,ALOE,LF,PF,7: Brand: MEDLINE

## (undated) DEVICE — SYRINGE BLB 50CC IRRIG PLIABLE FNGR FLNG GRAD FLSK DISP

## (undated) DEVICE — PADDING CAST W6INXL4YD ST COT RAYON MICROPLEATED HIGHLY

## (undated) DEVICE — ELECTRODE PT RET AD L9FT HI MOIST COND ADH HYDRGEL CORDED

## (undated) DEVICE — GUIDEWIRE 1.2 MM X 18 INCH, BOX OF                                    5, STERILE: Brand: BIOSURE

## (undated) DEVICE — LIGHT HANDLE: Brand: DEVON

## (undated) DEVICE — GLOVE SURG SZ 65 L12IN FNGR THK94MIL STD WHT LTX FREE

## (undated) DEVICE — CATHETER IV 20GA L1.25IN PNK FEP SFTY STR HUB RADPQ DISP

## (undated) DEVICE — GAUZE,SPONGE,4"X4",16PLY,STRL,LF,10/TRAY: Brand: MEDLINE

## (undated) DEVICE — 5.5 MM FULL RADIUS STRAIGHT                                    BLADES, POWER/EP-1, ORANGE, PACKAGED                                    6 PER BOX, STERILE

## (undated) DEVICE — SUTURE ETHLN SZ 3-0 L30IN NONABSORBABLE BLK FSL L30MM 3/8 1671H

## (undated) DEVICE — KENDALL SCD EXPRESS SLEEVES, KNEE LENGTH, MEDIUM: Brand: KENDALL SCD

## (undated) DEVICE — SUTURE FIBERWIRE SZ 2 W/ TAPERED NEEDLE BLUE L38IN NONABSORB BLU L26.5MM 1/2 CIRCLE AR7200

## (undated) DEVICE — PACK PROCEDURE SURG ARTHSCP CUST

## (undated) DEVICE — 2.4 MM X 15 INCH DRILL TIP PASSING                                    PIN, STERILE: Brand: ENDOBUTTON

## (undated) DEVICE — DRESSING FOAM W10XL10CM ABSRB ANTIMIC SAFETAC TECHNOLOGY

## (undated) DEVICE — Z DISCONTINUED USE 2429233 DRESSING FOAM W10XL10CM 5 LAYR SELF ADH VERSATILE SAFETAC

## (undated) DEVICE — 2.4MM X 10 INCH DRILL-TIP GUIDE WIRE: Brand: ENDOBUTTON

## (undated) DEVICE — KNIFE SURG 11MM FOR ACL GRFT RECON

## (undated) DEVICE — ABDOMINAL PAD: Brand: DERMACEA

## (undated) DEVICE — ADHESIVE SKIN CLSR 0.7ML TOP DERMBND ADV

## (undated) DEVICE — X-RAY DETECTABLE SPONGES,16 PLY: Brand: VISTEC

## (undated) DEVICE — SUTURE FIBERWIRE FIBERSTICK SZ 2-0 L50/12IN NONABSORBABLE AR7209

## (undated) DEVICE — PADDING CAST W6INXL4YD NONSTERILE COT RAYON MICROPLEATED

## (undated) DEVICE — SMOOTHER TOOL 9.5MM: Brand: ACUFEX

## (undated) DEVICE — AMBIENT SUPER TURBOVAC 90: Brand: COBLATION

## (undated) DEVICE — DRILL BIT 2.0MM (5/64'') X 128.0MM

## (undated) DEVICE — DRAPE ARTHRO 90X124IN KNEE SMS FAB EXT FLD COLL PCH RESIST

## (undated) DEVICE — SOLUTION IV IRRIG 500ML 0.9% SODIUM CHL 2F7123

## (undated) DEVICE — 3M™ TEGADERM™ TRANSPARENT FILM DRESSING FRAME STYLE, 1624W, 2-3/8 IN X 2-3/4 IN (6 CM X 7 CM), 100/CT 4CT/CASE: Brand: 3M™ TEGADERM™

## (undated) DEVICE — 1810 FOAM BLOCK NEEDLE COUNTER: Brand: DEVON

## (undated) DEVICE — PACK COOL L W14XL6.5IN 3 LAYR CONSTR SFT CLP CLSR 4 TIE

## (undated) DEVICE — SUTURE VCRL SZ 0 L18IN ABSRB UD L36MM CT-1 1/2 CIR J840D

## (undated) DEVICE — SOLUTION IV 1000ML LAC RINGERS PH 6.5 INJ USP VIAFLX PLAS

## (undated) DEVICE — SET GRAV VENT NVENT CK VLV 3 NDL FREE PRT 10 GTT

## (undated) DEVICE — GLOVE SURG SZ 8 L12IN FNGR THK94MIL STD WHT LTX FREE

## (undated) DEVICE — SOLUTION IRRIG 5L LAC R BG

## (undated) DEVICE — SUTURE MCRYL SZ 4-0 L18IN ABSRB UD L19MM PS-2 3/8 CIR PRIM Y496G

## (undated) DEVICE — 5.5 CM ACROMIOBLASTER STRAIGHT                                    BURRS, POWER/EP-1, BRICK RED, 8000                                    MAXIMUM RPM, PACKAGED 6 PER BOX, STERILE

## (undated) DEVICE — Z CONVERTED USE 2273232 BANDAGE COMPR W6INXL11YD E KNIT DBL SELF CLSR EZE-BAND

## (undated) DEVICE — MEDI-VAC NON-CONDUCTIVE SUCTION TUBING: Brand: CARDINAL HEALTH

## (undated) DEVICE — SYSTEM GRFT PREP 30MM LNG WHT W HI STRENGTH SUT FOR KNEE

## (undated) DEVICE — TUBE IRRIG L8IN LNG PT W/ CONN FOR PMP SYS REDEUCE

## (undated) DEVICE — GLOVE ORANGE PI 8   MSG9080

## (undated) DEVICE — SUTURE FIBERLOOP SZ 2-0 L20IN NONABSORBABLE BLU STR NDL AR7234

## (undated) DEVICE — DRESSING,GAUZE,XEROFORM,CURAD,1"X8",ST: Brand: CURAD

## (undated) DEVICE — GOWN,REINFORCED,POLY,AURORA,XXLARGE,STR: Brand: MEDLINE

## (undated) DEVICE — SUTURE VCRL SZ 2-0 L18IN ABSRB UD CT-1 L36MM 1/2 CIR J839D

## (undated) DEVICE — TUBING PMP L8FT LNG W/ CONN FOR AR-6400 REDEUCE

## (undated) DEVICE — CANNULA THREADED FLEX 6.5 X 72MM: Brand: CLEAR-TRAC

## (undated) DEVICE — ZIMMER® STERILE DISPOSABLE TOURNIQUET CUFF WITH PLC, DUAL PORT, SINGLE BLADDER, 30 IN. (76 CM)

## (undated) DEVICE — TOWEL,OR,DSP,ST,BLUE,STD,8/PK,10PK/CS: Brand: MEDLINE

## (undated) DEVICE — SUTURE NONABSORBABLE MONOFILAMENT 3-0 PS-1 18 IN BLK ETHILON 1663H

## (undated) DEVICE — GOWN,SIRUS,POLYRNF,SETINSLV,L,20/CS: Brand: MEDLINE

## (undated) DEVICE — Z DISCONTINUED USE 2275686 GLOVE SURG SZ 8 L12IN FNGR THK13MIL WHT ISOLEX POLYISOPRENE

## (undated) DEVICE — PENCIL ES L3M BTTN SWCH S STL HEX LOK BLDE ELECTRD HOLSTER

## (undated) DEVICE — GLOVE ORANGE PI 7 1/2   MSG9075

## (undated) DEVICE — GLOVE ORANGE PI 7   MSG9070

## (undated) DEVICE — SKIN MARKER,REGULAR TIP WITH RULER AND LABELS: Brand: DEVON

## (undated) DEVICE — AIRLIFE™ NASAL OXYGEN CANNULA CURVED, FLARED TIP, WITH 7 FEET (2.1 M) CRUSH RESISTANT TUBING, OVER-THE-EAR STYLE: Brand: AIRLIFE™

## (undated) DEVICE — COTTON UNDERCAST PADDING,CRIMPED FINISH: Brand: WEBRIL

## (undated) DEVICE — T-DRAPE,EXTREMITY,STERILE: Brand: MEDLINE

## (undated) DEVICE — 3.5 MM FULL RADIUS STRAIGHT                                    BLADES, POWER/EP-1, BEIGE, PACKAGED                                    6 PER BOX, STERILE

## (undated) DEVICE — C-ARM: Brand: UNBRANDED

## (undated) DEVICE — SYSTEM GRFT PREP 30MM LNG GRN WHT W HI STRENGTH SUT FOR KNEE

## (undated) DEVICE — CANNULA ARTHSCP L5CM DIA12MM DBL DAM 1 PC MOLD LO PROF FLNG

## (undated) DEVICE — DYONICS 4.0 MM ELITE                                    ACROMIOBLASTER STRAIGHT DISPOSABLE                                    BURRS, SAGE GREEN, 10000 MAXIMUM                                    RPM, PACKAGED 6 PER BOX, STERILE

## (undated) DEVICE — CONVERTED USE 304929 SPONGE GAUZE CURITY 4X4IN 16-PLY ST

## (undated) DEVICE — DRILL SURG FLIPCUTTER III

## (undated) DEVICE — SHEET,DRAPE,53X77,STERILE: Brand: MEDLINE

## (undated) DEVICE — PADDING CAST W4INXL4YD ST COT RAYON MICROPLEATED HIGHLY